# Patient Record
Sex: FEMALE | Race: WHITE | Employment: FULL TIME | URBAN - METROPOLITAN AREA
[De-identification: names, ages, dates, MRNs, and addresses within clinical notes are randomized per-mention and may not be internally consistent; named-entity substitution may affect disease eponyms.]

---

## 2021-09-03 ENCOUNTER — HOSPITAL ENCOUNTER (OUTPATIENT)
Dept: PHYSICAL THERAPY | Age: 46
Discharge: HOME | End: 2021-09-03
Payer: MEDICAID

## 2021-09-03 PROCEDURE — 97161 PT EVAL LOW COMPLEX 20 MIN: CPT | Performed by: PHYSICAL THERAPIST

## 2021-09-03 PROCEDURE — 97016 VASOPNEUMATIC DEVICE THERAPY: CPT | Performed by: PHYSICAL THERAPIST

## 2021-09-03 PROCEDURE — 97140 MANUAL THERAPY 1/> REGIONS: CPT | Performed by: PHYSICAL THERAPIST

## 2021-09-03 NOTE — PROGRESS NOTES
Physical Therapy at [facility],   a part of [address] [place] [address]  [address]  [place], [address]  Phone: [phone]  Fax: [phone]    Plan of Care/Statement of Necessity for Physical Therapy Services  2-15    Patient name: Anette Huang  : 1975  Provider#: [id number]  Referral source: Ralph Bey (Jody),*      Medical/Treatment Diagnosis: Left knee pain [M25.562]     Prior Hospitalization: see medical history     Comorbidities: None  Prior Level of Function: Able to bend, walk, run without knee pain or instability. Medications: Verified on Patient Summary List    Start of Care: 9/3/2021      Onset Date: 2021       The Plan of Care and following information is based on the information from the initial evaluation. Assessment/ key information: This patient presents with left knee pain, tenderness, effusion and pitting edema, decreased ROM, decreased gait tolerance and impaired function.     Evaluation Complexity History LOW Complexity : Zero comorbidities / personal factors that will impact the outcome / POC; Examination LOW Complexity : 1-2 Standardized tests and measures addressing body structure, function, activity limitation and / or participation in recreation  ;Presentation LOW Complexity : Stable, uncomplicated  ;Clinical Decision Making MEDIUM Complexity : FOTO score of 26-74  Overall Complexity Rating: LOW     Problem List: pain affecting function, decrease ROM, decrease strength, edema affecting function, impaired gait/ balance, decrease ADL/ functional abilitiies, decrease activity tolerance and decrease flexibility/ joint mobility   Treatment Plan may include any combination of the following: Therapeutic exercise, Therapeutic activities, Neuromuscular re-education, Physical agent/modality, Gait/balance training, Manual therapy, Patient education, Self Care training, Functional mobility training, Home safety training and Stair training  Patient / Family readiness to learn indicated by: asking questions, trying to perform skills and interest  Persons(s) to be included in education: patient (P)  Barriers to Learning/Limitations: None  Patient Goal (s): Agressive ROM prehab for surgery prep, post surgery ROM.   Patient Self Reported Health Status: excellent  Rehabilitation Potential: good    Short Term Goals: To be accomplished in 4-5 treatments:  1. AROM -3 to 110 degrees to improve gait pattern and ability to bend. 2. PROM 0 to 115 degrees to improve ability to bend and walk stairs. 3.  Independent HEP to date to address ROM limitations causing impaired function. Frequency / Duration: Patient to be seen 3 times per week for 4-5  treatments. Patient/ Caregiver education and instruction: exercises    [x]  Plan of care has been reviewed with PTA    New goals and plan will be established post surgery. Jagjit Matos V, PT 9/3/2021   ________________________________________________________________________    I certify that the above Therapy Services are being furnished while the patient is under my care. I agree with the treatment plan and certify that this therapy is necessary.     Physician's Signature:____________________  Date:____________Time: _________      Ralph Bailey(Noreen) P,*

## 2021-09-03 NOTE — PROGRESS NOTES
PT INITIAL EVALUATION NOTE 2-15    Patient Name: Mj Moseley  LOUP:[id number]  : 1975  [x]  Patient  Verified  Payor: [address] [place],9D / Plan: [address] [place] [id number] / Product Type: Managed Care Medicaid /    In time: 11:10 am  Out time: 12:15 pm  Total Treatment Time (min): 65  Visit #: 1     Treatment Area: Left knee pain [M25.562]    SUBJECTIVE  Pain Level (0-10 scale): 1  Any medication changes, allergies to medications, adverse drug reactions, diagnosis change, or new procedure performed?: [] No    [x] Yes (see summary sheet for update)  Subjective:     Patient is a . 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 She was trying to get out of the way of play, her left foot slipped causing her knee to buckle. She felt a pop as she went down. She saw Dr. Kevyn Bra, MRI + for ACL, MCL, and lateral meniscus tear. She is scheduled for surgery next week, but has limited ROM at this time, which may delay her surgery. She went to another PT this past week, but states they did not push her ROM. She was referred here to increase her ROM so that her surgery will hopefully go forward. She complains of left knee pain, especially at the [facility] attachment, swelling, decreased ROM with severe pain at end range of both flexion and extension. Her goal is to return to being a  as soon as possible. OBJECTIVE/EXAMINATION  Posture:  Left knee lacks end range extension. Gait:  2 axillary crutches, left knee brace. Effusion:    Mid Patella: +2 cm    Supra Patella:  +2 cm    AROM:      Right Knee:  +3 to 140 degrees       Left Knee:     -10 to 65 degrees     PROM:   Left knee:  -8 to 90 degrees  (-2 to 95 degrees after Game Ready)     STRENGTH: Not tested. Special Tests:  Valgus:  Not tested  Lachman's:  Not tested  Patella apprehension:  Negative    Palpation:  Tenderness right MCL attachment onto the medial femoral condyle. There is pitting edema in tissues around the knee. Modality rationale: decrease edema, decrease inflammation and decrease pain to improve the patients ability to bend, walk, run. Min Type Additional Details    [] Estim: []Att   []Unatt        []TENS instruct                  []IFC  []Premod   []NMES                     []Other:  []w/US   []w/ice   []w/heat  Position:  Location:    []  Traction: [] Cervical       []Lumbar                       [] Prone          []Supine                       []Intermittent   []Continuous Lbs:  [] before manual  [] after manual  []w/heat    []  Ultrasound: []Continuous   [] Pulsed at:                            []1MHz   []3MHz Location:  W/cm2:    []  Paraffin         Location:  []w/heat    []  Ice     []  Heat  []  Ice massage Position:  Location:    []  Laser  []  Other: Position:  Location:   15 [x]  Vasopneumatic Device Pressure:       [] lo [] med [x] hi   Temperature: 34 degrees   [x] Skin assessment post-treatment:  [x]intact []redness- no adverse reaction    []redness  adverse reaction:   20 min Manual Therapy:    Patella mobs  Gentle knee extension stretch pre and post Gameready  Seated knee mobs:  Medial rotation, anterior and posterior glides to increase ROM  Seated flexion PROM pre and post Gameready   Rationale: decrease pain, increase ROM and increase tissue extensibility  to improve the patients ability to bend, walk, and run.           With   [x] TE   [] TA   [] Neuro   [] SC   [] other: Patient Education: [x] Review HEP    [] Progressed/Changed HEP based on:   [] positioning   [] body mechanics   [] transfers   [] heat/ice application    [] other:      Pain Level (0-10 scale) post treatment: 1    ASSESSMENT:      [x]  See Plan of CodeNgo V, PT 9/3/2021

## 2021-09-07 ENCOUNTER — HOSPITAL ENCOUNTER (OUTPATIENT)
Dept: PHYSICAL THERAPY | Age: 46
Discharge: HOME | End: 2021-09-07
Payer: MEDICAID

## 2021-09-07 PROCEDURE — 97140 MANUAL THERAPY 1/> REGIONS: CPT | Performed by: PHYSICAL THERAPIST

## 2021-09-07 PROCEDURE — 97016 VASOPNEUMATIC DEVICE THERAPY: CPT | Performed by: PHYSICAL THERAPIST

## 2021-09-07 NOTE — PROGRESS NOTES
PT DAILY TREATMENT NOTE 2-15    Patient Name: Vane Ricketts  Date:2021  : 1975  [x]  Patient  Verified  Payor: Eh [place] / Plan: [address] [place] [id number] / Product Type: Managed Care Medicaid /    In time:  7:55 am  Out time: 8:35 am  Total Treatment Time (min): 40  Visit #:  2    Treatment Area: Left knee pain [M25.562]    SUBJECTIVE  Pain Level (0-10 scale): 0  Any medication changes, allergies to medications, adverse drug reactions, diagnosis change, or new procedure performed?: [x] No    [] Yes (see summary sheet for update)  Subjective functional status/changes:   [] No changes reported  \"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 \"    OBJECTIVE  Ambulates without crutches today. Left bent knee gait pattern. Effusion:   Mid patella:  +1 cm   Supra patella:  +1.5 cm    AROM:  -5 degrees extension    PROM:  -2 to 100 degrees, 105 degrees flexion after Gameready    Modality rationale: decrease edema, decrease inflammation and decrease pain to improve the patients ability to bend, walk, and run.    Min Type Additional Details       [] Estim: []Att   []Unatt    []TENS instruct                  []IFC  []Premod   []NMES                     []Other:  []w/US   []w/ice   []w/heat  Position:  Location:       []  Traction: [] Cervical       []Lumbar                       [] Prone          []Supine                       []Intermittent   []Continuous Lbs:  [] before manual  [] after manual  []w/heat    []  Ultrasound: []Continuous   [] Pulsed                       at: []1MHz   []3MHz Location:  W/cm2:    [] Paraffin         Location:   []w/heat    []  Ice     []  Heat  []  Ice massage Position:  Location:    []  Laser  []  Other: Position:  Location:   15   [x]  Vasopneumatic Device Pressure:       [] lo [] med [x] hi   Temperature: 34 degrees     [x] Skin assessment post-treatment:  [x]intact []redness- no adverse reaction    []redness  adverse reaction:      min Therapeutic Exercise:  [x] See flow sheet :   Rationale: increase ROM and increase strength to improve the patients ability to bend, walk, and run. 25 min Manual Therapy:    Patella mobs  Gentle knee extension stretch pre and post Gameready  Seated knee mobs:  Medial rotation, anterior and posterior glides to increase ROM  Seated flexion PROM    Rationale: decrease pain, increase ROM and increase tissue extensibility  to improve the patients ability to bend, walk, and run. With   [x] TE   [] TA   [] Neuro   [] SC   [] other: Patient Education: [x] Review HEP    [] Progressed/Changed HEP based on:   [] positioning   [] body mechanics   [] transfers   [] heat/ice application    [] other:      Other Objective/Functional Measures:      Pain Level (0-10 scale) post treatment: 1    ASSESSMENT/Changes in Function: Increased ROM today. Limited by pain at available end range. Patient will continue to benefit from skilled PT services to modify and progress therapeutic interventions, address functional mobility deficits, address ROM deficits, address strength deficits, analyze and address soft tissue restrictions, analyze and cue movement patterns, analyze and modify body mechanics/ergonomics and assess and modify postural abnormalities to attain remaining goals.      []  See Plan of Care  []  See progress note/recertification  []  See Discharge Summary         Progress towards goals / Updated goals:    PLAN  [x]  Upgrade activities as tolerated     [x]  Continue plan of care  []  Update interventions per flow sheet       []  Discharge due to:_  []  Other:_      Jeanine Bryson, PT 9/7/2021

## 2021-09-08 ENCOUNTER — HOSPITAL ENCOUNTER (OUTPATIENT)
Dept: PHYSICAL THERAPY | Age: 46
Discharge: HOME | End: 2021-09-08
Payer: MEDICAID

## 2021-09-08 PROCEDURE — 97016 VASOPNEUMATIC DEVICE THERAPY: CPT | Performed by: PHYSICAL THERAPIST

## 2021-09-08 PROCEDURE — 97140 MANUAL THERAPY 1/> REGIONS: CPT | Performed by: PHYSICAL THERAPIST

## 2021-09-08 PROCEDURE — 97110 THERAPEUTIC EXERCISES: CPT | Performed by: PHYSICAL THERAPIST

## 2021-09-08 NOTE — PROGRESS NOTES
PT DAILY TREATMENT NOTE 2-15    Patient Name: Darius Garibay  Date:2021  : 1975  [x]  Patient  Verified  Payor: [address] [place],9D / Plan: [address] [place] [id number] / Product Type: Managed Care Medicaid /    In time:  11:00 am  Out time: 12:10 am  Total Treatment Time (min): 70  Visit #:  3    Treatment Area: Left knee pain [M25.562]    SUBJECTIVE  Pain Level (0-10 scale): 0  Any medication changes, allergies to medications, adverse drug reactions, diagnosis change, or new procedure performed?: [x] No    [] Yes (see summary sheet for update)  Subjective functional status/changes:     \"I see him today. I'm getting so frustrated because I can't do the things that everyone else can. \"    OBJECTIVE    Effusion:   Mid patella:  +1 cm   Supra patella:  +1 cm    AROM:  0 degrees extension (after manual stretch)    PROM:  108 degrees flexion after Gameready    Modality rationale: decrease edema, decrease inflammation and decrease pain to improve the patients ability to bend, walk, and run.    Min Type Additional Details       [] Estim: []Att   []Unatt    []TENS instruct                  []IFC  []Premod   []NMES                     []Other:  []w/US   []w/ice   []w/heat  Position:  Location:       []  Traction: [] Cervical       []Lumbar                       [] Prone          []Supine                       []Intermittent   []Continuous Lbs:  [] before manual  [] after manual  []w/heat    []  Ultrasound: []Continuous   [] Pulsed                       at: []1MHz   []3MHz Location:  W/cm2:    [] Paraffin         Location:   []w/heat    []  Ice     []  Heat  []  Ice massage Position:  Location:    []  Laser  []  Other: Position:  Location:   15   [x]  Vasopneumatic Device Pressure:       [] lo [] med [x] hi   Temperature: 34 degrees     [x] Skin assessment post-treatment:  [x]intact []redness- no adverse reaction    []redness  adverse reaction:     15 min Therapeutic Exercise:  [x] See flow sheet :   Rationale: increase ROM and increase strength to improve the patients ability to bend, walk, and run. 35 min Manual Therapy:    Patella mobs  Gentle knee extension stretch pre and post Gameready  Seated knee mobs:  Medial rotation, anterior and posterior glides to increase ROM  Seated flexion PROM    Rationale: decrease pain, increase ROM and increase tissue extensibility  to improve the patients ability to bend, walk, and run. With   [x] TE   [] TA   [] Neuro   [] SC   [] other: Patient Education: [x] Review HEP    [] Progressed/Changed HEP based on:   [] positioning   [] body mechanics   [] transfers   [] heat/ice application    [] other:      Other Objective/Functional Measures:      Pain Level (0-10 scale) post treatment: 1    ASSESSMENT/Changes in Function: Improved extension, slightly increased flexion ROM. Medial and posterolateral knee pain at end range. Patient will continue to benefit from skilled PT services to modify and progress therapeutic interventions, address functional mobility deficits, address ROM deficits, address strength deficits, analyze and address soft tissue restrictions, analyze and cue movement patterns, analyze and modify body mechanics/ergonomics and assess and modify postural abnormalities to attain remaining goals. []  See Plan of Care  []  See progress note/recertification  []  See Discharge Summary         Progress towards goals / Updated goals:    PLAN  [x]  Upgrade activities as tolerated     [x]  Continue plan of care  []  Update interventions per flow sheet       []  Discharge due to:_  [x]  Follow up with Dr. Adriana Reyna today.      Shana Andino V, PT 9/8/2021

## 2021-09-13 ENCOUNTER — HOSPITAL ENCOUNTER (OUTPATIENT)
Dept: PHYSICAL THERAPY | Age: 46
Discharge: HOME | End: 2021-09-13
Payer: MEDICAID

## 2021-09-13 PROCEDURE — 97110 THERAPEUTIC EXERCISES: CPT | Performed by: PHYSICAL THERAPIST

## 2021-09-13 PROCEDURE — 97140 MANUAL THERAPY 1/> REGIONS: CPT | Performed by: PHYSICAL THERAPIST

## 2021-09-13 PROCEDURE — 97016 VASOPNEUMATIC DEVICE THERAPY: CPT | Performed by: PHYSICAL THERAPIST

## 2021-09-13 NOTE — PROGRESS NOTES
PT DAILY TREATMENT NOTE 2-15    Patient Name: Amarilys Fletcher  Date:2021  : 1975  [x]  Patient  Verified  Payor: Anoop Fragoso / Plan: [address] [place] [id number] / Product Type: Managed Care Medicaid /    In time:  10:00 am  Out time: 11:15 am  Total Treatment Time (min): 75  Visit #:  4    Treatment Area: Left knee pain [M25.562]    SUBJECTIVE  Pain Level (0-10 scale): 0  Any medication changes, allergies to medications, adverse drug reactions, diagnosis change, or new procedure performed?: [x] No    [] Yes (see summary sheet for update)  Subjective functional status/changes:     States seeing Dr. Eleni Youngblood this week. Dr. Tiffanie Castlilo out of town. OBJECTIVE    Effusion:   Mid patella:  +1 cm   Supra patella:  +1 cm    AROM:  0 degrees extension (after manual stretch)    PROM:  115 degrees flexion (seated), 120 degrees after Gameready. Modality rationale: decrease edema, decrease inflammation and decrease pain to improve the patients ability to bend, walk, and run.    Min Type Additional Details       [] Estim: []Att   []Unatt    []TENS instruct                  []IFC  []Premod   []NMES                     []Other:  []w/US   []w/ice   []w/heat  Position:  Location:       []  Traction: [] Cervical       []Lumbar                       [] Prone          []Supine                       []Intermittent   []Continuous Lbs:  [] before manual  [] after manual  []w/heat    []  Ultrasound: []Continuous   [] Pulsed                       at: []1MHz   []3MHz Location:  W/cm2:    [] Paraffin         Location:   []w/heat    []  Ice     []  Heat  []  Ice massage Position:  Location:    []  Laser  []  Other: Position:  Location:   15   [x]  Vasopneumatic Device Pressure:       [] lo [] med [x] hi   Temperature: 34 degrees     [x] Skin assessment post-treatment:  [x]intact []redness- no adverse reaction    []redness  adverse reaction:     20 min Therapeutic Exercise:  [x] See flow sheet : Rationale: increase ROM and increase strength to improve the patients ability to bend, walk, and run. 35 min Manual Therapy:    Patella mobs  Gentle knee extension stretch pre and post Gameready  Seated knee mobs:  Medial rotation, anterior and posterior glides to increase ROM  Seated flexion PROM (before and after Gameready)   Rationale: decrease pain, increase ROM and increase tissue extensibility  to improve the patients ability to bend, walk, and run. With   [x] TE   [] TA   [] Neuro   [] SC   [] other: Patient Education: [x] Review HEP    [] Progressed/Changed HEP based on:   [] positioning   [] body mechanics   [] transfers   [] heat/ice application    [] other:      Other Objective/Functional Measures:      Pain Level (0-10 scale) post treatment: 0    ASSESSMENT/Changes in Function: Increased PROM today. Patient will continue to benefit from skilled PT services to modify and progress therapeutic interventions, address functional mobility deficits, address ROM deficits, address strength deficits, analyze and address soft tissue restrictions, analyze and cue movement patterns, analyze and modify body mechanics/ergonomics and assess and modify postural abnormalities to attain remaining goals.      []  See Plan of Care  []  See progress note/recertification  []  See Discharge Summary         Progress towards goals / Updated goals:    PLAN  [x]  Upgrade activities as tolerated     [x]  Continue plan of care  []  Update interventions per flow sheet       []  Discharge due to:_  []     Mary Masterson, PT 9/13/2021

## 2021-09-14 ENCOUNTER — HOSPITAL ENCOUNTER (OUTPATIENT)
Dept: PHYSICAL THERAPY | Age: 46
Discharge: HOME | End: 2021-09-14
Payer: MEDICAID

## 2021-09-14 PROCEDURE — 97110 THERAPEUTIC EXERCISES: CPT | Performed by: PHYSICAL THERAPIST

## 2021-09-14 PROCEDURE — 97016 VASOPNEUMATIC DEVICE THERAPY: CPT | Performed by: PHYSICAL THERAPIST

## 2021-09-14 PROCEDURE — 97140 MANUAL THERAPY 1/> REGIONS: CPT | Performed by: PHYSICAL THERAPIST

## 2021-09-15 NOTE — PROGRESS NOTES
MD NOTE 2-15    Patient Name: Amarilys Fletcher  Date:9/15/2021  : 1975  [x]  Patient  Verified  Payor: Anoop Fragoso / Plan: [address] [place] [address] / Product Type: Managed Care Medicaid /      Treatment Area: Left knee pain [M25.562]    SUBJECTIVE  Pain Level (0-10 scale): 2-3    Subjective functional status/changes:     States knee is sore today. OBJECTIVE    Effusion:   Mid patella:  +1.5 cm   Supra patella:  +1 cm    AROM:  -5 degrees extension (0 degrees passive)    PROM:  122 degrees flexion (seated)    Pain Level (0-10 scale) post treatment: 2    ASSESSMENT/Changes in Function:  Improved flexion PROM today. Steady improvements in ROM since initial visit. PLAN:  [x]  Being evaluated by Dr. Eleni Youngblood for surgery.     Genny Rhodes V, PT 9/15/2021

## 2021-09-15 NOTE — PROGRESS NOTES
PT DAILY TREATMENT NOTE 2-15    Patient Name: Paul Larger  Date:2021  : 1975  [x]  Patient  Verified  Payor: Silvana Fitzgerald / Plan: 1 [facility] / Product Type: Managed Care Medicaid /    In time:  12:15 pm  Out time: 1:20 pm  Total Treatment Time (min): 65  Visit #:  5    Treatment Area: Left knee pain [M25.562]    SUBJECTIVE  Pain Level (0-10 scale): 2-3  Any medication changes, allergies to medications, adverse drug reactions, diagnosis change, or new procedure performed?: [x] No    [] Yes (see summary sheet for update)  Subjective functional status/changes:     States knee is sore today. OBJECTIVE    Effusion:   Mid patella:  +1.5 cm   Supra patella:  +1 cm    AROM:  -5 degrees extension    PROM:  122 degrees flexion (seated)    Modality rationale: decrease edema, decrease inflammation and decrease pain to improve the patients ability to bend, walk, and run.    Min Type Additional Details       [] Estim: []Att   []Unatt    []TENS instruct                  []IFC  []Premod   []NMES                     []Other:  []w/US   []w/ice   []w/heat  Position:  Location:       []  Traction: [] Cervical       []Lumbar                       [] Prone          []Supine                       []Intermittent   []Continuous Lbs:  [] before manual  [] after manual  []w/heat    []  Ultrasound: []Continuous   [] Pulsed                       at: []1MHz   []3MHz Location:  W/cm2:    [] Paraffin         Location:   []w/heat    []  Ice     []  Heat  []  Ice massage Position:  Location:    []  Laser  []  Other: Position:  Location:   15   [x]  Vasopneumatic Device Pressure:       [] lo [] med [x] hi   Temperature: 34 degrees     [x] Skin assessment post-treatment:  [x]intact []redness- no adverse reaction    []redness  adverse reaction:     20 min Therapeutic Exercise:  [x] See flow sheet :   Rationale: increase ROM and increase strength to improve the patients ability to bend, walk, and run. 25 min Manual Therapy:    Patella mobs  Gentle knee extension stretch pre and post Gameready  Seated knee mobs:  Medial rotation, anterior and posterior glides to increase ROM  Manual rectus femoris stretch  Seated flexion PROM    Rationale: decrease pain, increase ROM and increase tissue extensibility  to improve the patients ability to bend, walk, and run. With   [x] TE   [] TA   [] Neuro   [] SC   [] other: Patient Education: [x] Review HEP    [] Progressed/Changed HEP based on:   [] positioning   [] body mechanics   [] transfers   [] heat/ice application    [] other:      Other Objective/Functional Measures:      Pain Level (0-10 scale) post treatment: 2    ASSESSMENT/Changes in Function: Improved flexion PROM today. Patient will continue to benefit from skilled PT services to modify and progress therapeutic interventions, address functional mobility deficits, address ROM deficits, address strength deficits, analyze and address soft tissue restrictions, analyze and cue movement patterns, analyze and modify body mechanics/ergonomics and assess and modify postural abnormalities to attain remaining goals. []  See Plan of Care  []  See progress note/recertification  []  See Discharge Summary         Progress towards goals / Updated goals:    PLAN  [x]  Upgrade activities as tolerated     [x]  Continue plan of care  []  Update interventions per flow sheet       []  Discharge due to:_  [x]  Being evaluated by Dr. Lesley Ruiz for surgery.     Darío Lopez V, PT 9/14/2021

## 2021-09-21 ENCOUNTER — HOSPITAL ENCOUNTER (OUTPATIENT)
Dept: PHYSICAL THERAPY | Age: 46
Discharge: HOME | End: 2021-09-21
Payer: MEDICAID

## 2021-09-21 PROCEDURE — 97016 VASOPNEUMATIC DEVICE THERAPY: CPT | Performed by: PHYSICAL THERAPIST

## 2021-09-21 PROCEDURE — 97140 MANUAL THERAPY 1/> REGIONS: CPT | Performed by: PHYSICAL THERAPIST

## 2021-09-21 PROCEDURE — 97161 PT EVAL LOW COMPLEX 20 MIN: CPT | Performed by: PHYSICAL THERAPIST

## 2021-09-21 PROCEDURE — 97110 THERAPEUTIC EXERCISES: CPT | Performed by: PHYSICAL THERAPIST

## 2021-09-21 NOTE — PROGRESS NOTES
PT INITIAL EVALUATION NOTE 2-15    Patient Name: Richard Rodriguez  Date:2021  : 1975  [x]  Patient  Verified  Payor: [address],9D / Plan: [address] [place] [id number] / Product Type: Managed Care Medicaid /    In time:  10:40 am  Out time: 11:45 am   Total Treatment Time (min): 65  Visit #: 1     Treatment Area: Left knee pain [M25.562]    SUBJECTIVE  Pain Level (0-10 scale): 7  Any medication changes, allergies to medications, adverse drug reactions, diagnosis change, or new procedure performed?: [] No    [x] Yes (see summary sheet for update)  Subjective:     Patient is a . 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 She was trying to get out of the way of play, her left foot slipped causing her knee to buckle. She felt a pop as she went down. She saw Dr. Tessie Aden, MRI + for ACL, MCL, and lateral meniscus tear. Surgery was delayed because she developed a stiff knee. She received PT here in this office with improved ROM, but still not enough to have the procedure. Dr. Tessie Aden was going out of town so she was referred to Dr. Birgit Ochoa during that week. She had ACL reconstruction with patella tendon autograft on 2021. She complains of left knee pain, swelling, severe stiffness, inability to actively  her leg, even in the brace, and impaired function. Her goal is to return to being a  as soon as possible. OBJECTIVE/EXAMINATION  Gait:  Ambulates with 2 axillary crutches, post op brace, decreased stance left. Effusion: Mid Patella: +2.5 cm  Supra Patella:  +2.5 cm    AROM:      Right Knee:  +3 to 140 degrees         Left Knee: -5 to 25 degrees    PROM:   Left knee: -2 to 75 degrees        Flexibility:   L  Hamstrings   Mod limitation  Quads    Severe limitation    STRENGTH: Unable to actively engage her left  quad.       Special Tests:  Zoe's sign:  Negative    Palpation:  Tenderness donya scar area.    Modality rationale: decrease edema, decrease inflammation and decrease pain to improve the patients ability to bend, walk, run. Min Type Additional Details    [] Estim: []Att   []Unatt        []TENS instruct                  []IFC  []Premod   []NMES                     []Other:  []w/US   []w/ice   []w/heat  Position:  Location:    []  Traction: [] Cervical       []Lumbar                       [] Prone          []Supine                       []Intermittent   []Continuous Lbs:  [] before manual  [] after manual  []w/heat    []  Ultrasound: []Continuous   [] Pulsed at:                            []1MHz   []3MHz Location:  W/cm2:    []  Paraffin         Location:  []w/heat    []  Ice     []  Heat  []  Ice massage Position:  Location:    []  Laser  []  Other: Position:  Location:   15 [x]  Vasopneumatic Device Pressure:       [] lo [x] med [] hi   Temperature: 34 degrees   [x] Skin assessment post-treatment:  [x]intact []redness- no adverse reaction    []redness  adverse reaction:     10 min Therapeutic Exercise:  [x] See flow sheet :   Rationale: increase ROM and increase strength to improve the patients ability to bend, walk, run. 20 min Manual Therapy:    Patella mobs  Kathleen scar mobs  Gentle manual extension stretch  Seated flexion PROM (gravity assisted) repeated mid range to end range as tolerated in sitting   Rationale: decrease pain, increase ROM and increase tissue extensibility  to improve the patients ability to bend, walk, run.           With   [x] TE   [] TA   [] Neuro   [] SC   [] other: Patient Education: [x] Review HEP    [] Progressed/Changed HEP based on:   [] positioning   [] body mechanics   [] transfers   [] heat/ice application    [] other:        Other Objective/Functional Measures: FOTO Functional Measure: 24/100                  Pain Level (0-10 scale) post treatment: 5      ASSESSMENT:      [x]  See Plan of Eliecer Son V, PT 9/21/2021

## 2021-09-23 ENCOUNTER — HOSPITAL ENCOUNTER (OUTPATIENT)
Dept: PHYSICAL THERAPY | Age: 46
Discharge: HOME | End: 2021-09-23
Payer: MEDICAID

## 2021-09-23 PROCEDURE — 97016 VASOPNEUMATIC DEVICE THERAPY: CPT | Performed by: PHYSICAL THERAPIST

## 2021-09-23 PROCEDURE — 97110 THERAPEUTIC EXERCISES: CPT | Performed by: PHYSICAL THERAPIST

## 2021-09-23 PROCEDURE — 97140 MANUAL THERAPY 1/> REGIONS: CPT | Performed by: PHYSICAL THERAPIST

## 2021-09-23 NOTE — PROGRESS NOTES
PT DAILY TREATMENT NOTE - [facility] 2-15    Patient Name: Ed German  Date:2021  : 1975  [x]  Patient  Verified  Payor: Jaimie Lim / Plan: [address] [place] [id number] / Product Type: Managed Care Medicaid /    In time:  10:05 am  Out time:   11:15 am  Total Treatment Time (min): 70  Total Timed Codes (min): 50  1:1 Treatment Time ([facility] only): 50   Visit #:  2    Treatment Area: Left knee pain [M25.562]    SUBJECTIVE  Pain Level (0-10 scale): 5  Any medication changes, allergies to medications, adverse drug reactions, diagnosis change, or new procedure performed?: [x] No    [] Yes (see summary sheet for update)  Subjective functional status/changes:   [] No changes reported  \"It's really sore today. \"    OBJECTIVE  Effusion:  Mid and supra patella +2 cm    AROM:  -3 degrees extension    PROM:  82 degrees seated flexion (after prolonged gentle progressive stretch)    Modality rationale: decrease edema, decrease inflammation and decrease pain to improve the patients ability to bend, walk, run.    Min Type Additional Details       [] Estim: []Att   []Unatt    []TENS instruct                  []IFC  []Premod   []NMES                     []Other:  []w/US   []w/ice   []w/heat  Position:  Location:       []  Traction: [] Cervical       []Lumbar                       [] Prone          []Supine                       []Intermittent   []Continuous Lbs:  [] before manual  [] after manual  []w/heat    []  Ultrasound: []Continuous   [] Pulsed                       at: []1MHz   []3MHz Location:  W/cm2:    [] Paraffin         Location:   []w/heat    []  Ice     []  Heat  []  Ice massage Position:  Location:    []  Laser  []  Other: Position:  Location:   15   [x]  Vasopneumatic Device Pressure:       [] lo [x] med [] hi   Temperature: 34 degrees     [x] Skin assessment post-treatment:  [x]intact []redness- no adverse reaction    []redness  adverse reaction:     15 min Therapeutic Exercise:  [x] See flow sheet :   Rationale: increase ROM and increase strength to improve the patients ability to bend, walk, run. 35 min Manual Therapy:   Patella mobs  Kathleen scar mobs  Gentle manual extension stretch  Seated flexion PROM (gravity assisted) repeated mid range to end range as tolerated     Rationale: decrease pain, increase ROM and increase tissue extensibility to improve the patients ability to bend, walk, run.     min Gait Training:  ___ feet with ___ device on level surfaces with ___ level of assist   Rationale: increase ROM, increase strength, improve coordination and increase proprioception  to improve the patients ability to walk with normal gait pattern. With   [x] TE   [] TA   [] Neuro   [] SC   [] other: Patient Education: [x] Review HEP    [] Progressed/Changed HEP based on:   [] positioning   [] body mechanics   [] transfers   [] heat/ice application    [] other:      Other Objective/Functional Measures:      Pain Level (0-10 scale) post treatment: 2    ASSESSMENT/Changes in Function: Increased PROM today, tight initially, but improves with gentle progressive stretch. Patient will continue to benefit from skilled PT services to modify and progress therapeutic interventions, address functional mobility deficits, address ROM deficits, address strength deficits, analyze and address soft tissue restrictions, analyze and cue movement patterns, analyze and modify body mechanics/ergonomics and assess and modify postural abnormalities to attain remaining goals.      []  See Plan of Care  []  See progress note/recertification  []  See Discharge Summary         Progress towards goals / Updated goals:      PLAN  [x]  Upgrade activities as tolerated     [x]  Continue plan of care  []  Update interventions per flow sheet       []  Discharge due to:_  []  Other:_      Frank Perez, PT 9/23/2021

## 2021-09-23 NOTE — PROGRESS NOTES
Physical Therapy at [facility],   a part of [address], [address] [place] [address]  Phone: [phone]  Fax: [phone]    Plan of Care/Statement of Necessity for Physical Therapy Services  2-15    Patient name: Suad Luis  : 1975  Provider#: [id number]  Referral source: Violet Osman MD      Medical/Treatment Diagnosis: Left knee pain [M25.562]     Prior Hospitalization: see medical history     Comorbidities: None  Prior Level of Function: Able to bend, walk, run, cut, work as a  without limitation. Medications: Verified on Patient Summary List    Start of Care: 2021      Onset Date: 2021       The Plan of Care and following information is based on the information from the initial evaluation. Assessment/ key information: Patient presents s/p ACL reconstruction and menisectomy with left knee pain, effusion, decreased ROM, decreased quad control, deviated gait pattern and impaired function.     Evaluation Complexity History LOW Complexity : Zero comorbidities / personal factors that will impact the outcome / POC; Examination LOW Complexity : 1-2 Standardized tests and measures addressing body structure, function, activity limitation and / or participation in recreation  ;Presentation LOW Complexity : Stable, uncomplicated  ;Clinical Decision Making HIGH Complexity : FOTO score of 1- 25   Overall Complexity Rating: LOW     Problem List: pain affecting function, decrease ROM, decrease strength, edema affecting function, impaired gait/ balance, decrease ADL/ functional abilitiies, decrease activity tolerance, decrease flexibility/ joint mobility and decrease transfer abilities   Treatment Plan may include any combination of the following: Therapeutic exercise, Therapeutic activities, Neuromuscular re-education, Physical agent/modality, Gait/balance training, Manual therapy, Patient education, Self Care training, Functional mobility training, Home safety training and Stair training  Patient / Family readiness to learn indicated by: asking questions, trying to perform skills and interest  Persons(s) to be included in education: patient (P)  Barriers to Learning/Limitations: None  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   Patient Self Reported Health Status: excellent  Rehabilitation Potential: excellent    Short Term Goals: To be accomplished in 4 weeks:  1. AROM left knee 0 to 115 to improve functional bending. 2. PROM +1 to 120 degrees to improve functional bending. Long Term Goals: To be accomplished in 16 weeks:  1. AROM +2 to 135 degrees to tolerate bending and squatting. 2.  Strength left quads and hip 4+/5, hamstrings 5/5 to tolerate prolonged walking and stairs. 3.  Walks with normal gait pattern. 4.  Able to run, change directions, perform intentional cutting without difficulty. Frequency / Duration: Patient to be seen 2-3 times per week for 16 weeks. Patient/ Caregiver education and instruction: exercises    [x]  Plan of care has been reviewed with ALVERTO Sandoval, PT 9/21/2021   ________________________________________________________________________    I certify that the above Therapy Services are being furnished while the patient is under my care. I agree with the treatment plan and certify that this therapy is necessary.     Physician's Signature:____________________  Date:____________Time: _________      Tato Guthrie MD

## 2021-09-27 ENCOUNTER — HOSPITAL ENCOUNTER (OUTPATIENT)
Dept: PHYSICAL THERAPY | Age: 46
Discharge: HOME | End: 2021-09-27
Payer: MEDICAID

## 2021-09-27 PROCEDURE — 97140 MANUAL THERAPY 1/> REGIONS: CPT | Performed by: PHYSICAL THERAPIST

## 2021-09-27 PROCEDURE — 97110 THERAPEUTIC EXERCISES: CPT | Performed by: PHYSICAL THERAPIST

## 2021-09-27 NOTE — PROGRESS NOTES
PT DAILY TREATMENT NOTE - [facility] 2-15    Patient Name: Zara Bowers  Date:2021  : 1975  [x]  Patient  Verified  Payor: Judson Francois / Plan: [address] [place] [id number] / Product Type: Managed Care Medicaid /    In time:  11:30 am  Out time:  12:30 am  Total Treatment Time (min): 60  Total Timed Codes (min): 55  1:1 Treatment Time ( [address] only): 54   Visit #:  3    Treatment Area: Left knee pain [M25.562]    SUBJECTIVE  Pain Level (0-10 scale): 5  Any medication changes, allergies to medications, adverse drug reactions, diagnosis change, or new procedure performed?: [x] No    [] Yes (see summary sheet for update)  Subjective functional status/changes:   [] No changes reported  \"I feel like I'm regressing. I talked to someone else who's kid had it done and they're already at 90 degrees and able to do straight leg raises. \"    OBJECTIVE  Effusion:  Mid and supra patella +2 cm    PROM:  83 degrees seated flexion (after prolonged gentle progressive stretch)    Modality rationale: decrease edema, decrease inflammation and decrease pain to improve the patients ability to bend, walk, run.    Min Type Additional Details       [] Estim: []Att   []Unatt    []TENS instruct                  []IFC  []Premod   []NMES                     []Other:  []w/US   []w/ice   []w/heat  Position:  Location:       []  Traction: [] Cervical       []Lumbar                       [] Prone          []Supine                       []Intermittent   []Continuous Lbs:  [] before manual  [] after manual  []w/heat    []  Ultrasound: []Continuous   [] Pulsed                       at: []1MHz   []3MHz Location:  W/cm2:    [] Paraffin         Location:   []w/heat   At home []  Ice     []  Heat  []  Ice massage Position:  Location:    []  Laser  []  Other: Position:  Location:      [x]  Vasopneumatic Device Pressure:       [] lo [x] med [] hi   Temperature: 34 degrees     [x] Skin assessment post-treatment:  [x]intact []redness- no adverse reaction    []redness  adverse reaction:     25 min Therapeutic Exercise:  [x] See flow sheet :   Rationale: increase ROM and increase strength to improve the patients ability to bend, walk, run. 30 min Manual Therapy:   Patella mobs  Kathleen scar mobs  Gentle manual extension stretch  Seated flexion PROM (gravity assisted) repeated mid range to end range as tolerated     Rationale: decrease pain, increase ROM and increase tissue extensibility to improve the patients ability to bend, walk, run.     min Gait Training:  ___ feet with ___ device on level surfaces with ___ level of assist   Rationale: increase ROM, increase strength, improve coordination and increase proprioception  to improve the patients ability to walk with normal gait pattern. With   [x] TE   [] TA   [] Neuro   [] SC   [] other: Patient Education: [x] Review HEP    [] Progressed/Changed HEP based on:   [] positioning   [] body mechanics   [] transfers   [] heat/ice application    [] other:      Other Objective/Functional Measures:      Pain Level (0-10 scale) post treatment: 3    ASSESSMENT/Changes in Function: Patient progressing well to date. Flexion stiff initially, but improves with progressive PROM. Patient will continue to benefit from skilled PT services to modify and progress therapeutic interventions, address functional mobility deficits, address ROM deficits, address strength deficits, analyze and address soft tissue restrictions, analyze and cue movement patterns, analyze and modify body mechanics/ergonomics and assess and modify postural abnormalities to attain remaining goals. []  See Plan of Care  []  See progress note/recertification  []  See Discharge Summary         Progress towards goals / Updated goals:    PLAN  [x]  Upgrade activities as tolerated     [x]  Continue plan of care  []  Update interventions per flow sheet       []  Discharge due to:_  [x]  Follow up at end of the week. Lew Blevins V, PT 9/27/2021

## 2021-09-30 ENCOUNTER — HOSPITAL ENCOUNTER (OUTPATIENT)
Dept: PHYSICAL THERAPY | Age: 46
Discharge: HOME | End: 2021-09-30
Payer: MEDICAID

## 2021-09-30 PROCEDURE — 97014 ELECTRIC STIMULATION THERAPY: CPT | Performed by: PHYSICAL THERAPIST

## 2021-09-30 PROCEDURE — 97016 VASOPNEUMATIC DEVICE THERAPY: CPT | Performed by: PHYSICAL THERAPIST

## 2021-09-30 PROCEDURE — 97140 MANUAL THERAPY 1/> REGIONS: CPT | Performed by: PHYSICAL THERAPIST

## 2021-09-30 PROCEDURE — 97110 THERAPEUTIC EXERCISES: CPT | Performed by: PHYSICAL THERAPIST

## 2021-09-30 NOTE — PROGRESS NOTES
PT DAILY TREATMENT NOTE - [facility] 2-15    Patient Name: Aaron Sousa  Date:2021  : 1975  [x]  Patient  Verified  Payor: Aisha Kidney / Plan: [address] [place] [id number] / Product Type: Managed Care Medicaid /    In time:  12:25 pm  Out time:  1:40 am  Total Treatment Time (min): 75  Total Timed Codes (min): 40  1:1 Treatment Time ( only): 40   Visit #:  4    Treatment Area: Left knee pain [M25.562]    SUBJECTIVE  Pain Level (0-10 scale): 0  Any medication changes, allergies to medications, adverse drug reactions, diagnosis change, or new procedure performed?: [x] No    [] Yes (see summary sheet for update)  Subjective functional status/changes:   [] No changes reported  \"I feel like I'm regressing. I talked to someone else who's kid had it done and they're already at 90 degrees and able to do straight leg raises. \"    OBJECTIVE  Effusion:  Mid and supra patella +1.5 cm    PROM:  80 degrees seated flexion (after prolonged gentle progressive stretch)    Modality rationale: decrease edema, decrease inflammation and decrease pain to improve the patients ability to bend, walk, run.    Min Type Additional Details      10 [x] Estim: []Att   []Unatt    []TENS instruct                  []IFC  []Premod   []NMES                     [x]Czech 10/20 sec on/off  []w/US   []w/ice   []w/heat  Position:  Location:  Left quad       []  Traction: [] Cervical       []Lumbar                       [] Prone          []Supine                       []Intermittent   []Continuous Lbs:  [] before manual  [] after manual  []w/heat    []  Ultrasound: []Continuous   [] Pulsed                       at: []1MHz   []3MHz Location:  W/cm2:    [] Paraffin         Location:   []w/heat    []  Ice     []  Heat  []  Ice massage Position:  Location:    []  Laser  []  Other: Position:  Location:     15 [x]  Vasopneumatic Device Pressure:       [] lo [x] med [] hi   Temperature: 34 degrees     [x] Skin assessment post-treatment:  [x]intact []redness- no adverse reaction    []redness  adverse reaction:     15 min Therapeutic Exercise:  [x] See flow sheet :   Rationale: increase ROM and increase strength to improve the patients ability to bend, walk, run. 25 min Manual Therapy:   Patella mobs  Kathleen scar mobs  Gentle manual extension stretch  Seated flexion PROM (gravity assisted) repeated mid range to end range as tolerated     Rationale: decrease pain, increase ROM and increase tissue extensibility to improve the patients ability to bend, walk, run.     min Gait Training:  ___ feet with ___ device on level surfaces with ___ level of assist   Rationale: increase ROM, increase strength, improve coordination and increase proprioception  to improve the patients ability to walk with normal gait pattern. With   [x] TE   [] TA   [] Neuro   [] SC   [] other: Patient Education: [x] Review HEP    [] Progressed/Changed HEP based on:   [] positioning   [] body mechanics   [] transfers   [] heat/ice application    [] other:      Other Objective/Functional Measures:      Pain Level (0-10 scale) post treatment: 2    ASSESSMENT/Changes in Function: Patient progressing. Flexion stiff initially, but improves with progressive PROM. Patient will continue to benefit from skilled PT services to modify and progress therapeutic interventions, address functional mobility deficits, address ROM deficits, address strength deficits, analyze and address soft tissue restrictions, analyze and cue movement patterns, analyze and modify body mechanics/ergonomics and assess and modify postural abnormalities to attain remaining goals.      []  See Plan of Care  []  See progress note/recertification  []  See Discharge Summary         Progress towards goals / Updated goals:    PLAN  [x]  Upgrade activities as tolerated     [x]  Continue plan of care  []  Update interventions per flow sheet       []  Discharge due to:_  [x]  Follow up at end of the week.      Jordy Barragan, PT 9/30/2021

## 2021-10-01 NOTE — PROGRESS NOTES
MD NOTE - [facility] 2-15    Patient Name: Mandy Silveira  Date:10/1/2021  : 1975  [x]  Patient  Verified  Payor: Ioana Noriega / Plan: 1 [facility] / Product Type: Managed Care Medicaid /      Treatment Area: Left knee pain [M25.562]    SUBJECTIVE  Pain Level (0-10 scale): 2    Subjective functional status/changes:    States knee feels better today, but still very stiff. Continues to work on ROM at home. OBJECTIVE  Effusion:  Mid and supra patella +1.5 cm    PROM:  80 degrees seated flexion (after prolonged gentle progressive stretch)      Pain Level (0-10 scale) post treatment: 2    ASSESSMENT/Changes in Function: Patient progressing. Flexion stiff initially, but improves with progressive PROM. PLAN  [x]  Upgrade activities as tolerated     [x]  Continue plan of care  [x]  Post-op follow up this week.     Carla Milton V, PT 10/1/2021

## 2021-10-05 ENCOUNTER — HOSPITAL ENCOUNTER (OUTPATIENT)
Dept: PHYSICAL THERAPY | Age: 46
Discharge: HOME | End: 2021-10-05
Payer: MEDICAID

## 2021-10-05 PROCEDURE — 97110 THERAPEUTIC EXERCISES: CPT | Performed by: PHYSICAL THERAPIST

## 2021-10-05 PROCEDURE — 97016 VASOPNEUMATIC DEVICE THERAPY: CPT | Performed by: PHYSICAL THERAPIST

## 2021-10-05 PROCEDURE — 97140 MANUAL THERAPY 1/> REGIONS: CPT | Performed by: PHYSICAL THERAPIST

## 2021-10-06 NOTE — PROGRESS NOTES
PT DAILY TREATMENT NOTE - [facility] 2-15    Patient Name: Dee Lino  Date:10/5/2021  : 1975  [x]  Patient  Verified  Payor: Krystle Ivy / Plan: [address] [place] [address] / Product Type: Managed Care Medicaid /    In time:  1:50 pm  Out time: 3:00 pm  Total Treatment Time (min): 70  Total Timed Codes (min): 45  1:1 Treatment Time ([facility] only): 39   Visit #:  5    Treatment Area: Left knee pain [M25.562]    SUBJECTIVE  Pain Level (0-10 scale): 7   Any medication changes, allergies to medications, adverse drug reactions, diagnosis change, or new procedure performed?: [x] No    [] Yes (see summary sheet for update)  Subjective functional status/changes:   [] No changes reported  States MD pleased with progress. \"He wants me off the crutches by the end of the week. I'm really sore, I've been working it. \"    OBJECTIVE  Effusion:     Mid patella +1.5 cm   Supra patella +1 cm    PROM:  90 degrees seated flexion (after prolonged gentle progressive stretch)    Modality rationale: decrease edema, decrease inflammation and decrease pain to improve the patients ability to bend, walk, run.    Min Type Additional Details       [x] Estim: []Att   []Unatt    []TENS instruct                  []IFC  []Premod   []NMES                     [x][place] 10/20 sec on/off  []w/US   []w/ice   []w/heat  Position:  Location:  Left quad       []  Traction: [] Cervical       []Lumbar                       [] Prone          []Supine                       []Intermittent   []Continuous Lbs:  [] before manual  [] after manual  []w/heat    []  Ultrasound: []Continuous   [] Pulsed                       at: []1MHz   []3MHz Location:  W/cm2:    [] Paraffin         Location:   []w/heat    []  Ice     []  Heat  []  Ice massage Position:  Location:    []  Laser  []  Other: Position:  Location:     15 [x]  Vasopneumatic Device Pressure:       [] lo [x] med [] hi   Temperature: 34 degrees     [x] Skin assessment post-treatment:  [x]intact []redness- no adverse reaction    []redness  adverse reaction:     20 min Therapeutic Exercise:  [x] See flow sheet :   Rationale: increase ROM and increase strength to improve the patients ability to bend, walk, run. 25 min Manual Therapy:   Patella mobs  [place] scar mobs  Gentle manual extension stretch  Seated flexion PROM (gravity assisted) repeated mid range to end range as tolerated     Rationale: decrease pain, increase ROM and increase tissue extensibility to improve the patients ability to bend, walk, run.     min Gait Training:  ___ feet with ___ device on level surfaces with ___ level of assist   Rationale: increase ROM, increase strength, improve coordination and increase proprioception  to improve the patients ability to walk with normal gait pattern. With   [x] TE   [] TA   [] Neuro   [] SC   [] other: Patient Education: [x] Review HEP    [] Progressed/Changed HEP based on:   [] positioning   [] body mechanics   [] transfers   [] heat/ice application    [] other:      Other Objective/Functional Measures:      Pain Level (0-10 scale) post treatment: 2    ASSESSMENT/Changes in Function: Increased PROM, quad control slightly improved. Patient will continue to benefit from skilled PT services to modify and progress therapeutic interventions, address functional mobility deficits, address ROM deficits, address strength deficits, analyze and address soft tissue restrictions, analyze and cue movement patterns, analyze and modify body mechanics/ergonomics and assess and modify postural abnormalities to attain remaining goals. []  See Plan of Care  []  See progress note/recertification  []  See Discharge Summary         Progress towards goals / Updated goals:    PLAN  [x]  Upgrade activities as tolerated     [x]  Continue plan of care  []  Update interventions per flow sheet       []  Discharge due to:_  [x]  Progress ROM, strength, and gait.   Wean crutches as tolerated.     Mary Ryder V, PT 10/5/2021

## 2021-10-07 ENCOUNTER — HOSPITAL ENCOUNTER (OUTPATIENT)
Dept: PHYSICAL THERAPY | Age: 46
Discharge: HOME | End: 2021-10-07
Payer: MEDICAID

## 2021-10-07 PROCEDURE — 97032 APPL MODALITY 1+ESTIM EA 15: CPT | Performed by: PHYSICAL THERAPIST

## 2021-10-07 PROCEDURE — 97110 THERAPEUTIC EXERCISES: CPT | Performed by: PHYSICAL THERAPIST

## 2021-10-07 PROCEDURE — 97016 VASOPNEUMATIC DEVICE THERAPY: CPT | Performed by: PHYSICAL THERAPIST

## 2021-10-07 PROCEDURE — 97140 MANUAL THERAPY 1/> REGIONS: CPT | Performed by: PHYSICAL THERAPIST

## 2021-10-08 NOTE — PROGRESS NOTES
PT DAILY TREATMENT NOTE - [facility] 2-15    Patient Name: Teagan Alarcon  Date:10/7/2021  : 1975  [x]  Patient  Verified  Payor: Jose Antonio Casillas / Plan: [address] [place] [id number] / Product Type: Managed Care Medicaid /    In time:  12:05 pm  Out time: 1:20 pm  Total Treatment Time (min): 75  Total Timed Codes (min): 45  1:1 Treatment Time ( [address] only): 45   Visit #:  6    Treatment Area: Left knee pain [M25.562]    SUBJECTIVE  Pain Level (0-10 scale): 4  Any medication changes, allergies to medications, adverse drug reactions, diagnosis change, or new procedure performed?: [x] No    [] Yes (see summary sheet for update)  Subjective functional status/changes:   [] No changes reported  \"It hurts below the knee today. \"    OBJECTIVE  Effusion:     Mid patella +1 cm   Supra patella +1 cm    PROM:  80 degrees seated flexion (after prolonged gentle progressive stretch)    Modality rationale: decrease edema, decrease inflammation and decrease pain to improve the patients ability to bend, walk, run.    Min Type Additional Details      10 [x] Estim: []Att   []Unatt    []TENS instruct                  []IFC  []Premod   []NMES                     [x]Ecuadorean 10/20 sec on/off  []w/US   []w/ice   []w/heat  Position:  Long sitting  Location:  Left quad       []  Traction: [] Cervical       []Lumbar                       [] Prone          []Supine                       []Intermittent   []Continuous Lbs:  [] before manual  [] after manual  []w/heat    []  Ultrasound: []Continuous   [] Pulsed                       at: []1MHz   []3MHz Location:  W/cm2:    [] Paraffin         Location:   []w/heat    []  Ice     []  Heat  []  Ice massage Position:  Location:    []  Laser  []  Other: Position:  Location:     15 [x]  Vasopneumatic Device Pressure:       [] lo [x] med [] hi   Temperature: 34 degrees     [x] Skin assessment post-treatment:  [x]intact []redness- no adverse reaction    []redness  adverse reaction: 20 min Therapeutic Exercise:  [x] See flow sheet :   Rationale: increase ROM and increase strength to improve the patients ability to bend, walk, run. 25 min Manual Therapy:   Patella mobs  Kathleen scar mobs  Gentle manual extension stretch  Seated flexion PROM (gravity assisted) repeated mid range to end range as tolerated     Rationale: decrease pain, increase ROM and increase tissue extensibility to improve the patients ability to bend, walk, run.     min Gait Training:  ___ feet with ___ device on level surfaces with ___ level of assist   Rationale: increase ROM, increase strength, improve coordination and increase proprioception  to improve the patients ability to walk with normal gait pattern. With   [x] TE   [] TA   [] Neuro   [] SC   [] other: Patient Education: [x] Review HEP    [] Progressed/Changed HEP based on:   [] positioning   [] body mechanics   [] transfers   [] heat/ice application    [] other:      Other Objective/Functional Measures:      Pain Level (0-10 scale) post treatment: 2    ASSESSMENT/Changes in Function: Tight today. Patient will continue to benefit from skilled PT services to modify and progress therapeutic interventions, address functional mobility deficits, address ROM deficits, address strength deficits, analyze and address soft tissue restrictions, analyze and cue movement patterns, analyze and modify body mechanics/ergonomics and assess and modify postural abnormalities to attain remaining goals. []  See Plan of Care  []  See progress note/recertification  []  See Discharge Summary         Progress towards goals / Updated goals:    PLAN  [x]  Upgrade activities as tolerated     [x]  Continue plan of care  []  Update interventions per flow sheet       []  Discharge due to:_  [x]  Progress ROM, strength, and gait. Wean crutches as tolerated.     Alba Huertas V, PT 10/7/2021

## 2021-10-11 ENCOUNTER — HOSPITAL ENCOUNTER (OUTPATIENT)
Dept: PHYSICAL THERAPY | Age: 46
Discharge: HOME | End: 2021-10-11
Payer: MEDICAID

## 2021-10-11 PROCEDURE — 97016 VASOPNEUMATIC DEVICE THERAPY: CPT | Performed by: PHYSICAL THERAPIST

## 2021-10-11 PROCEDURE — 97110 THERAPEUTIC EXERCISES: CPT | Performed by: PHYSICAL THERAPIST

## 2021-10-11 PROCEDURE — 97140 MANUAL THERAPY 1/> REGIONS: CPT | Performed by: PHYSICAL THERAPIST

## 2021-10-11 NOTE — PROGRESS NOTES
PT DAILY TREATMENT NOTE - [facility] 2-15    Patient Name: Siria Padilla  Date:10/11/2021  : 1975  [x]  Patient  Verified  Payor: Charline Room / Plan: [facility] / Product Type: Managed Care Medicaid /    In time:  4:15 pm  Out time: 5:25 pm  Total Treatment Time (min): 70  Total Timed Codes (min): 45  1:1 Treatment Time ( [address] only): 39   Visit #:  7    Treatment Area: Left knee pain [M25.562]    SUBJECTIVE  Pain Level (0-10 scale): 4  Any medication changes, allergies to medications, adverse drug reactions, diagnosis change, or new procedure performed?: [x] No    [] Yes (see summary sheet for update)  Subjective functional status/changes:   [] No changes reported  States sore and stiff. 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 OBJECTIVE  Effusion:     Mid patella +1.5 cm   Supra patella +1.5 cm    PROM:  81 degrees seated flexion (after prolonged gentle progressive stretch)    Modality rationale: decrease edema, decrease inflammation and decrease pain to improve the patients ability to bend, walk, run.    Min Type Additional Details       [x] Estim: []Att   []Unatt    []TENS instruct                  []IFC  []Premod   []NMES                     [x]Vatican citizen 10/20 sec on/off  []w/US   []w/ice   []w/heat  Position:  Long sitting  Location:  Left quad       []  Traction: [] Cervical       []Lumbar                       [] Prone          []Supine                       []Intermittent   []Continuous Lbs:  [] before manual  [] after manual  []w/heat    []  Ultrasound: []Continuous   [] Pulsed                       at: []1MHz   []3MHz Location:  W/cm2:    [] Paraffin         Location:   []w/heat    []  Ice     []  Heat  []  Ice massage Position:  Location:    []  Laser  []  Other: Position:  Location:     15 [x]  Vasopneumatic Device Pressure:       [] lo [x] med [] hi   Temperature: 34 degrees     [x] Skin assessment post-treatment:  [x]intact []redness- no adverse reaction    []redness  adverse reaction:     20 min Therapeutic Exercise:  [x] See flow sheet :   Rationale: increase ROM and increase strength to improve the patients ability to bend, walk, run. 25 min Manual Therapy:   Patella mobs  Kathleen scar mobs  Gentle manual extension stretch  Seated flexion PROM (gravity assisted) repeated mid range to end range as tolerated     Rationale: decrease pain, increase ROM and increase tissue extensibility to improve the patients ability to bend, walk, run.     min Gait Training:  ___ feet with ___ device on level surfaces with ___ level of assist   Rationale: increase ROM, increase strength, improve coordination and increase proprioception  to improve the patients ability to walk with normal gait pattern. With   [x] TE   [] TA   [] Neuro   [] SC   [] other: Patient Education: [x] Review HEP    [] Progressed/Changed HEP based on:   [] positioning   [] body mechanics   [] transfers   [] heat/ice application    [] other:      Other Objective/Functional Measures:      Pain Level (0-10 scale) post treatment: 3    ASSESSMENT/Changes in Function: Increased pain and swelling today. Patient will continue to benefit from skilled PT services to modify and progress therapeutic interventions, address functional mobility deficits, address ROM deficits, address strength deficits, analyze and address soft tissue restrictions, analyze and cue movement patterns, analyze and modify body mechanics/ergonomics and assess and modify postural abnormalities to attain remaining goals. []  See Plan of Care  []  See progress note/recertification  []  See Discharge Summary         Progress towards goals / Updated goals:    PLAN  [x]  Upgrade activities as tolerated     [x]  Continue plan of care  []  Update interventions per flow sheet       []  Discharge due to:_  [x]  Progress ROM, strength, and gait. Wean crutches as tolerated.     Bree Yepez, PT 10/11/2021

## 2021-10-14 ENCOUNTER — HOSPITAL ENCOUNTER (OUTPATIENT)
Dept: PHYSICAL THERAPY | Age: 46
Discharge: HOME | End: 2021-10-14
Payer: MEDICAID

## 2021-10-14 PROCEDURE — 97140 MANUAL THERAPY 1/> REGIONS: CPT | Performed by: PHYSICAL THERAPIST

## 2021-10-14 PROCEDURE — 97110 THERAPEUTIC EXERCISES: CPT | Performed by: PHYSICAL THERAPIST

## 2021-10-14 NOTE — PROGRESS NOTES
PT DAILY TREATMENT NOTE - [facility] 2-15    Patient Name: Dougie Christy  Date:10/14/2021  : 1975  [x]  Patient  Verified  Payor: [employer]ICAID / Plan: Tien Chao / Product Type: Managed Care Medicaid /    In time:  12:15 pm  Out time: 1:30 pm  Total Treatment Time (min): 75  Total Timed Codes (min): 55  1:1 Treatment Time ( [address] only): 55   Visit #:  8    Treatment Area: Left knee pain [M25.562]    SUBJECTIVE  Pain Level (0-10 scale): 2  Any medication changes, allergies to medications, adverse drug reactions, diagnosis change, or new procedure performed?: [x] No    [] Yes (see summary sheet for update)  Subjective functional status/changes:   [] No changes reported  States able to sleep last night. Knee feels better since taking pain medication. \"I'm not using crutches today. \"    OBJECTIVE  Improved stance phase left LE without crutches    Effusion:     Mid patella +.5 cm   Supra patella +.5 cm    PROM:  85 degrees seated flexion (after prolonged gentle progressive stretch)    Modality rationale: decrease edema, decrease inflammation and decrease pain to improve the patients ability to bend, walk, run.    Min Type Additional Details       [x] Estim: []Att   []Unatt    []TENS instruct                  []IFC  []Premod   []NMES                     [x]Cymraes 10/20 sec on/off  []w/US   []w/ice   []w/heat  Position:  Long sitting  Location:  Left quad       []  Traction: [] Cervical       []Lumbar                       [] Prone          []Supine                       []Intermittent   []Continuous Lbs:  [] before manual  [] after manual  []w/heat    []  Ultrasound: []Continuous   [] Pulsed                       at: []1MHz   []3MHz Location:  W/cm2:    [] Paraffin         Location:   []w/heat    []  Ice     []  Heat  []  Ice massage Position:  Location:    []  Laser  []  Other: Position:  Location:     15 [x]  Vasopneumatic Device Pressure:       [] lo [x] med [] hi   Temperature: 34 degrees     [x] Skin assessment post-treatment:  [x]intact []redness- no adverse reaction    []redness  adverse reaction:     25 min Therapeutic Exercise:  [x] See flow sheet :   Rationale: increase ROM and increase strength to improve the patients ability to bend, walk, run. 30 min Manual Therapy:   Patella mobs  Kathleen scar mobs  Gentle manual extension stretch  Seated flexion PROM (gravity assisted) repeated mid range to end range as tolerated     Rationale: decrease pain, increase ROM and increase tissue extensibility to improve the patients ability to bend, walk, run.     min Gait Training:  ___ feet with ___ device on level surfaces with ___ level of assist   Rationale: increase ROM, increase strength, improve coordination and increase proprioception  to improve the patients ability to walk with normal gait pattern. With   [x] TE   [] TA   [] Neuro   [] SC   [] other: Patient Education: [x] Review HEP    [] Progressed/Changed HEP based on:   [] positioning   [] body mechanics   [] transfers   [] heat/ice application    [] other:      Other Objective/Functional Measures:      Pain Level (0-10 scale) post treatment: 1    ASSESSMENT/Changes in Function: Decreased pain, decreased swelling, increased PROM today. Patient able to do limited reps of active SLR's today, with quad lag noted. Patient will continue to benefit from skilled PT services to modify and progress therapeutic interventions, address functional mobility deficits, address ROM deficits, address strength deficits, analyze and address soft tissue restrictions, analyze and cue movement patterns, analyze and modify body mechanics/ergonomics and assess and modify postural abnormalities to attain remaining goals.      []  See Plan of Care  []  See progress note/recertification  []  See Discharge Summary         Progress towards goals / Updated goals:    PLAN  [x]  Upgrade activities as tolerated     [x]  Continue plan of care  []  Update interventions per flow sheet       []  Discharge due to:_  [x]  Progress ROM, strength, and gait.       Debbie Canchola V, PT 10/14/2021

## 2021-10-18 ENCOUNTER — HOSPITAL ENCOUNTER (OUTPATIENT)
Dept: PHYSICAL THERAPY | Age: 46
Discharge: HOME | End: 2021-10-18
Payer: MEDICAID

## 2021-10-18 PROCEDURE — 97140 MANUAL THERAPY 1/> REGIONS: CPT | Performed by: PHYSICAL THERAPIST

## 2021-10-18 PROCEDURE — 97014 ELECTRIC STIMULATION THERAPY: CPT | Performed by: PHYSICAL THERAPIST

## 2021-10-18 PROCEDURE — 97110 THERAPEUTIC EXERCISES: CPT | Performed by: PHYSICAL THERAPIST

## 2021-10-18 NOTE — PROGRESS NOTES
PT DAILY TREATMENT NOTE - [facility] 2-15    Patient Name: Kenton Gaitan  Date:10/18/2021  : 1975  [x]  Patient  Verified  Payor: Arulisses Gills / Plan: [address] [place] [address] / Product Type: Managed Care Medicaid /    In time:  12:25 pm  Out time: 1:40 pm  Total Treatment Time (min): 75  Total Timed Codes (min): 55  1:1 Treatment Time ( [address] only): 55   Visit #:  9    Treatment Area: Left knee pain [M25.562]    SUBJECTIVE  Pain Level (0-10 scale): 2  Any medication changes, allergies to medications, adverse drug reactions, diagnosis change, or new procedure performed?: [x] No    [] Yes (see summary sheet for update)  Subjective functional status/changes:   [] No changes reported  \"It feels better. I can do the leg raises a lot better on my own. \"    OBJECTIVE  Improved stance phase left LE without crutches    Effusion:     Mid patella +.5 cm   Supra patella +.5 cm    PROM:  87 degrees seated flexion (after prolonged gentle progressive stretch)    Modality rationale: decrease edema, decrease inflammation and decrease pain to improve the patients ability to bend, walk, run.    Min Type Additional Details       [x] Estim: []Att   []Unatt    []TENS instruct                  []IFC  []Premod   []NMES                     [x]Portuguese 10/20 sec on/off  []w/US   []w/ice   []w/heat  Position:  Long sitting  Location:  Left quad       []  Traction: [] Cervical       []Lumbar                       [] Prone          []Supine                       []Intermittent   []Continuous Lbs:  [] before manual  [] after manual  []w/heat    []  Ultrasound: []Continuous   [] Pulsed                       at: []1MHz   []3MHz Location:  W/cm2:    [] Paraffin         Location:   []w/heat    []  Ice     []  Heat  []  Ice massage Position:  Location:    []  Laser  []  Other: Position:  Location:     15 [x]  Vasopneumatic Device Pressure:       [] lo [x] med [] hi   Temperature: 34 degrees     [x] Skin assessment post-treatment: [x]intact []redness- no adverse reaction    []redness  adverse reaction:     25 min Therapeutic Exercise:  [x] See flow sheet :   Rationale: increase ROM and increase strength to improve the patients ability to bend, walk, run. 30 min Manual Therapy:   Patella mobs  Katlheen scar mobs  Gentle manual extension stretch  Seated flexion PROM (gravity assisted) repeated mid range to end range as tolerated     Rationale: decrease pain, increase ROM and increase tissue extensibility to improve the patients ability to bend, walk, run.     min Gait Training:  ___ feet with ___ device on level surfaces with ___ level of assist   Rationale: increase ROM, increase strength, improve coordination and increase proprioception  to improve the patients ability to walk with normal gait pattern. With   [x] TE   [] TA   [] Neuro   [] SC   [] other: Patient Education: [x] Review HEP    [] Progressed/Changed HEP based on:   [] positioning   [] body mechanics   [] transfers   [] heat/ice application    [] other:      Other Objective/Functional Measures:      Pain Level (0-10 scale) post treatment: 1    ASSESSMENT/Changes in Function: Improved quad control noted. Patient will continue to benefit from skilled PT services to modify and progress therapeutic interventions, address functional mobility deficits, address ROM deficits, address strength deficits, analyze and address soft tissue restrictions, analyze and cue movement patterns, analyze and modify body mechanics/ergonomics and assess and modify postural abnormalities to attain remaining goals. []  See Plan of Care  []  See progress note/recertification  []  See Discharge Summary         Progress towards goals / Updated goals:    PLAN  [x]  Upgrade activities as tolerated     [x]  Continue plan of care  []  Update interventions per flow sheet       []  Discharge due to:_  [x]  Progress ROM, strength, and gait.       Mary Ryder V, PT 10/18/2021

## 2021-10-21 ENCOUNTER — HOSPITAL ENCOUNTER (OUTPATIENT)
Dept: PHYSICAL THERAPY | Age: 46
Discharge: HOME | End: 2021-10-21
Payer: MEDICAID

## 2021-10-21 PROCEDURE — 97110 THERAPEUTIC EXERCISES: CPT | Performed by: PHYSICAL THERAPIST

## 2021-10-21 PROCEDURE — 97140 MANUAL THERAPY 1/> REGIONS: CPT | Performed by: PHYSICAL THERAPIST

## 2021-10-21 PROCEDURE — 97016 VASOPNEUMATIC DEVICE THERAPY: CPT | Performed by: PHYSICAL THERAPIST

## 2021-10-22 NOTE — PROGRESS NOTES
PT DAILY TREATMENT NOTE - [facility] 2-15    Patient Name: Delia Carson  Date:10/21/2021  : 1975  [x]  Patient  Verified  Payor: BLUE [employer]OSS MEDICAID / Plan: [facility] Natasha Marcos / Product Type: Managed Care Medicaid /    In time:  12:15 pm  Out time: 1:30 pm  Total Treatment Time (min): 75  Total Timed Codes (min): 55  1:1 Treatment Time ( [address] only): 55   Visit #:  10    Treatment Area: Left knee pain [M25.562]    SUBJECTIVE  Pain Level (0-10 scale): 3  Any medication changes, allergies to medications, adverse drug reactions, diagnosis change, or new procedure performed?: [x] No    [] Yes (see summary sheet for update)  Subjective functional status/changes:   [] No changes reported  \"I've been stretching it all morning. I've been doing a lot of leg raises. \"    OBJECTIVE  Improved SLR's    Effusion:     Mid patella +1 cm   Supra patella +.5 cm    PROM:  91 degrees seated flexion (after prolonged gentle progressive stretch)    Modality rationale: decrease edema, decrease inflammation and decrease pain to improve the patients ability to bend, walk, run.    Min Type Additional Details       [x] Estim: []Att   []Unatt    []TENS instruct                  []IFC  []Premod   []NMES                     [x]Greek 10/20 sec on/off  []w/US   []w/ice   []w/heat  Position:  Long sitting  Location:  Left quad       []  Traction: [] Cervical       []Lumbar                       [] Prone          []Supine                       []Intermittent   []Continuous Lbs:  [] before manual  [] after manual  []w/heat    []  Ultrasound: []Continuous   [] Pulsed                       at: []1MHz   []3MHz Location:  W/cm2:    [] Paraffin         Location:   []w/heat    []  Ice     []  Heat  []  Ice massage Position:  Location:    []  Laser  []  Other: Position:  Location:     15 [x]  Vasopneumatic Device Pressure:       [] lo [x] med [] hi   Temperature: 34 degrees     [x] Skin assessment post-treatment:  [x]intact []redness- no adverse reaction    []redness  adverse reaction:     25 min Therapeutic Exercise:  [x] See flow sheet :   Rationale: increase ROM and increase strength to improve the patients ability to bend, walk, run. 30 min Manual Therapy:   Patella mobs  Kathleen scar mobs  Gentle manual extension stretch  Seated flexion PROM (gravity assisted) repeated mid range to end range as tolerated     Rationale: decrease pain, increase ROM and increase tissue extensibility to improve the patients ability to bend, walk, run.     min Gait Training:  ___ feet with ___ device on level surfaces with ___ level of assist   Rationale: increase ROM, increase strength, improve coordination and increase proprioception  to improve the patients ability to walk with normal gait pattern. With   [x] TE   [] TA   [] Neuro   [] SC   [] other: Patient Education: [x] Review HEP    [] Progressed/Changed HEP based on:   [] positioning   [] body mechanics   [] transfers   [] heat/ice application    [] other:      Other Objective/Functional Measures:      Pain Level (0-10 scale) post treatment: 2    ASSESSMENT/Changes in Function: Improved quad control. Slightly improved PROM. Patient will continue to benefit from skilled PT services to modify and progress therapeutic interventions, address functional mobility deficits, address ROM deficits, address strength deficits, analyze and address soft tissue restrictions, analyze and cue movement patterns, analyze and modify body mechanics/ergonomics and assess and modify postural abnormalities to attain remaining goals. []  See Plan of Care  []  See progress note/recertification  []  See Discharge Summary         Progress towards goals / Updated goals:    PLAN  [x]  Upgrade activities as tolerated     [x]  Continue plan of care  []  Update interventions per flow sheet       []  Discharge due to:_  [x]  Progress ROM, strength, and gait.       Melinda Engel V, PT 10/21/2021

## 2021-10-25 ENCOUNTER — HOSPITAL ENCOUNTER (OUTPATIENT)
Dept: PHYSICAL THERAPY | Age: 46
Discharge: HOME | End: 2021-10-25
Payer: MEDICAID

## 2021-10-25 PROCEDURE — 97140 MANUAL THERAPY 1/> REGIONS: CPT | Performed by: PHYSICAL THERAPIST

## 2021-10-25 PROCEDURE — 97016 VASOPNEUMATIC DEVICE THERAPY: CPT | Performed by: PHYSICAL THERAPIST

## 2021-10-25 PROCEDURE — 97110 THERAPEUTIC EXERCISES: CPT | Performed by: PHYSICAL THERAPIST

## 2021-10-25 NOTE — PROGRESS NOTES
PT PROGRESS NOTE - [facility] 2-15    Patient Name: Bk Slaughter  Date:10/25/2021  : 1975  [x]  Patient  Verified  Payor: Stefano Carne / Plan: [facility] [employer] / Product Type: Managed Care Medicaid /    In time:  1:15 pm  Out time: 2:35 pm  Total Treatment Time (min): 80  Total Timed Codes (min): 55  1:1 Treatment Time ( [address] only): 55   Visit #:  11    Treatment Area: Left knee pain [M25.562]    SUBJECTIVE  Pain Level (0-10 scale): 2  Any medication changes, allergies to medications, adverse drug reactions, diagnosis change, or new procedure performed?: [x] No    [] Yes (see summary sheet for update)  Subjective functional status/changes:   [] No changes reported  \"The scar on the inside was really red this weekend. I put peroxide and antibiotic ointment on it. It looks a lot better. \"    OBJECTIVE  Local redness at anteromedial portal scar     Effusion:     Mid patella +1 cm   Supra patella +1 cm    AROM:  0 to 85 degrees (seated flexion)    PROM:  99 degrees seated flexion (after prolonged gentle progressive stretch)    Modality rationale: decrease edema, decrease inflammation and decrease pain to improve the patients ability to bend, walk, run.    Min Type Additional Details       [x] Estim: []Att   []Unatt    []TENS instruct                  []IFC  []Premod   []NMES                     [x]Mongolian 10/20 sec on/off  []w/US   []w/ice   []w/heat  Position:  Long sitting  Location:  Left quad       []  Traction: [] Cervical       []Lumbar                       [] Prone          []Supine                       []Intermittent   []Continuous Lbs:  [] before manual  [] after manual  []w/heat    []  Ultrasound: []Continuous   [] Pulsed                       at: []1MHz   []3MHz Location:  W/cm2:    [] Paraffin         Location:   []w/heat    []  Ice     []  Heat  []  Ice massage Position:  Location:    []  Laser  []  Other: Position:  Location:     15 [x]  Vasopneumatic Device Pressure:       [] lo [x] med [] hi   Temperature: 34 degrees     [x] Skin assessment post-treatment:  [x]intact []redness- no adverse reaction    []redness  adverse reaction:     20 min Therapeutic Exercise:  [x] See flow sheet :   Rationale: increase ROM and increase strength to improve the patients ability to bend, walk, run. 40 min Manual Therapy:   Patella mobs  Kathleen scar mobs  Gentle manual extension stretch  Seated flexion PROM (gravity assisted) repeated mid range to end range as tolerated     Rationale: decrease pain, increase ROM and increase tissue extensibility to improve the patients ability to bend, walk, run.     min Gait Training:  ___ feet with ___ device on level surfaces with ___ level of assist   Rationale: increase ROM, increase strength, improve coordination and increase proprioception  to improve the patients ability to walk with normal gait pattern. With   [x] TE   [] TA   [] Neuro   [] SC   [] other: Patient Education: [x] Review HEP    [] Progressed/Changed HEP based on:   [] positioning   [] body mechanics   [] transfers   [] heat/ice application    [] other:      Other Objective/Functional Measures:      Pain Level (0-10 scale) post treatment: 3    ASSESSMENT/Changes in Function: Increased flexion PROM. Limited by pain and stiffness at end range. Patient will continue to benefit from skilled PT services to modify and progress therapeutic interventions, address functional mobility deficits, address ROM deficits, address strength deficits, analyze and address soft tissue restrictions, analyze and cue movement patterns, analyze and modify body mechanics/ergonomics and assess and modify postural abnormalities to attain remaining goals. []  See Plan of Care  []  See progress note/recertification  []  See Discharge Summary         Progress towards goals / Updated goals:  Short Term Goals:   1. AROM left knee 0 to 115 to improve functional bending. Improved, but not met. 2.   PROM +1 to 120 degrees to improve. Improved, but not met. PLAN  [x]  Upgrade activities as tolerated     [x]  Continue plan of care  []  Update interventions per flow sheet       []  Discharge due to:_  [x]  Progress ROM, strength, and gait. MD follow up next week.     Stefano Maynard V, PT 10/25/2021

## 2021-10-28 ENCOUNTER — HOSPITAL ENCOUNTER (OUTPATIENT)
Dept: PHYSICAL THERAPY | Age: 46
Discharge: HOME | End: 2021-10-28
Payer: MEDICAID

## 2021-10-28 PROCEDURE — 97140 MANUAL THERAPY 1/> REGIONS: CPT | Performed by: PHYSICAL THERAPIST

## 2021-10-28 PROCEDURE — 97016 VASOPNEUMATIC DEVICE THERAPY: CPT | Performed by: PHYSICAL THERAPIST

## 2021-10-28 PROCEDURE — 97110 THERAPEUTIC EXERCISES: CPT | Performed by: PHYSICAL THERAPIST

## 2021-10-28 NOTE — PROGRESS NOTES
PT DAILY TREATMENT NOTE - [facility] 2-15    Patient Name: Jeff Busby  Date:10/28/2021  : 1975  [x]  Patient  Verified  Payor: Cata Herrera / Plan: [address] [place] [address] / Product Type: Managed Care Medicaid /    In time:  12:25 pm  Out time: 1:40 pm  Total Treatment Time (min): 75  Total Timed Codes (min): 55  1:1 Treatment Time ( [address] only): 55   Visit #:  12    Treatment Area: Left knee pain [M25.562]    SUBJECTIVE  Pain Level (0-10 scale): 2  Any medication changes, allergies to medications, adverse drug reactions, diagnosis change, or new procedure performed?: [x] No    [] Yes (see summary sheet for update)  Subjective functional status/changes:   [] No changes reported  \"You killed me. It was really sore the next day. \"    OBJECTIVE    Effusion:     Mid patella +1 cm   Supra patella +1 cm    AROM:  0 to 85 degrees (seated flexion)    PROM:  101 degrees seated flexion (after manual therapy and prolonged progressive stretch)    Modality rationale: decrease edema, decrease inflammation and decrease pain to improve the patients ability to bend, walk, run.    Min Type Additional Details       [x] Estim: []Att   []Unatt    []TENS instruct                  []IFC  []Premod   []NMES                     [x]Trinidadian 10/20 sec on/off  []w/US   []w/ice   []w/heat  Position:  Long sitting  Location:  Left quad       []  Traction: [] Cervical       []Lumbar                       [] Prone          []Supine                       []Intermittent   []Continuous Lbs:  [] before manual  [] after manual  []w/heat    []  Ultrasound: []Continuous   [] Pulsed                       at: []1MHz   []3MHz Location:  W/cm2:    [] Paraffin         Location:   []w/heat    []  Ice     []  Heat  []  Ice massage Position:  Location:    []  Laser  []  Other: Position:  Location:     15 [x]  Vasopneumatic Device Pressure:       [] lo [x] med [] hi   Temperature: 34 degrees     [x] Skin assessment post-treatment:  [x]intact []redness- no adverse reaction    []redness  adverse reaction:     15 min Therapeutic Exercise:  [x] See flow sheet :   Rationale: increase ROM and increase strength to improve the patients ability to bend, walk, run. 40 min Manual Therapy:   Patella mobs  Kathleen scar mobs  Manual extension stretch  Seated flexion PROM (gravity assisted) repeated mid range to end range as tolerated    STM left quad  Supine rectus femoris stretch  Prone quad stretch with pre-stretch on wedge   Rationale: decrease pain, increase ROM and increase tissue extensibility to improve the patients ability to bend, walk, run.     min Gait Training:  ___ feet with ___ device on level surfaces with ___ level of assist   Rationale: increase ROM, increase strength, improve coordination and increase proprioception  to improve the patients ability to walk with normal gait pattern. With   [x] TE   [] TA   [] Neuro   [] SC   [] other: Patient Education: [x] Review HEP    [] Progressed/Changed HEP based on:   [] positioning   [] body mechanics   [] transfers   [] heat/ice application    [] other:      Other Objective/Functional Measures:      Pain Level (0-10 scale) post treatment: 2    ASSESSMENT/Changes in Function: Increased ROM today. Pain and stiffness limit her motion and progress. Patient will continue to benefit from skilled PT services to modify and progress therapeutic interventions, address functional mobility deficits, address ROM deficits, address strength deficits, analyze and address soft tissue restrictions, analyze and cue movement patterns, analyze and modify body mechanics/ergonomics and assess and modify postural abnormalities to attain remaining goals. []  See Plan of Care  []  See progress note/recertification  []  See Discharge Summary         Progress towards goals / Updated goals:  Short Term Goals:   1. AROM left knee 0 to 115 to improve functional bending. Improved, but not met. 2.   PROM +1 to 120 degrees to improve. Improved, but not met. PLAN  [x]  Upgrade activities as tolerated     [x]  Continue plan of care  []  Update interventions per flow sheet       []  Discharge due to:_  [x]  Progress ROM, strength, and gait. MD follow up next week.     Keith Fung V, PT 10/28/2021

## 2021-11-01 ENCOUNTER — HOSPITAL ENCOUNTER (OUTPATIENT)
Dept: PHYSICAL THERAPY | Age: 46
Discharge: HOME | End: 2021-11-01
Payer: MEDICAID

## 2021-11-01 PROCEDURE — 97110 THERAPEUTIC EXERCISES: CPT | Performed by: PHYSICAL THERAPIST

## 2021-11-01 PROCEDURE — 97140 MANUAL THERAPY 1/> REGIONS: CPT | Performed by: PHYSICAL THERAPIST

## 2021-11-01 PROCEDURE — 97016 VASOPNEUMATIC DEVICE THERAPY: CPT | Performed by: PHYSICAL THERAPIST

## 2021-11-01 NOTE — PROGRESS NOTES
PT DAILY TREATMENT NOTE - [facility] 2-15    Patient Name: Nicole Mccormack  Date:2021  : 1975  [x]  Patient  Verified  Payor: [employer]ICA[employer] / [facility] / Product Type: Managed Care Medicaid /    In time:  1:15 pm  Out time: 2:30 pm  Total Treatment Time (min): 75  Total Timed Codes (min): 55  1:1 Treatment Time ( [address] only): 55   Visit #:  13    Treatment Area: Left knee pain [M25.562]    SUBJECTIVE  Pain Level (0-10 scale): 2  Any medication changes, allergies to medications, adverse drug reactions, diagnosis change, or new procedure performed?: [x] No    [] Yes (see summary sheet for update)  Subjective functional status/changes:   [] No changes reported  States knee beginning to feel better since taking Medrol Dosepak. Continues to work hard at home on ROM and strength. OBJECTIVE  Effusion:     Mid patella +1 cm   Supra patella +1/2 cm    AROM:  0 to 90 degrees (seated flexion)    PROM:  105 degrees seated flexion (after manual therapy and prolonged progressive stretch)    Gait:  Decreased knee flexion in swing phase    Modality rationale: decrease edema, decrease inflammation and decrease pain to improve the patients ability to bend, walk, run.    Min Type Additional Details       [x] Estim: []Att   []Unatt    []TENS instruct                  []IFC  []Premod   []NMES                     [x]Somali 10/20 sec on/off  []w/US   []w/ice   []w/heat  Position:  Long sitting  Location:  Left quad       []  Traction: [] Cervical       []Lumbar                       [] Prone          []Supine                       []Intermittent   []Continuous Lbs:  [] before manual  [] after manual  []w/heat    []  Ultrasound: []Continuous   [] Pulsed                       at: []1MHz   []3MHz Location:  W/cm2:    [] Paraffin         Location:   []w/heat    []  Ice     []  Heat  []  Ice massage Position:  Location:    []  Laser  []  Other: Position:  Location:     15 [x]  Vasopneumatic Device Pressure:       [] lo [x] med [] hi   Temperature: 34 degrees     [x] Skin assessment post-treatment:  [x]intact []redness- no adverse reaction    []redness  adverse reaction:     15 min Therapeutic Exercise:  [x] See flow sheet :   Rationale: increase ROM and increase strength to improve the patients ability to bend, walk, run. 45 min Manual Therapy:   Patella mobs  Kathleen scar mobs  Manual extension stretch  Seated flexion PROM (gravity assisted) repeated mid range to end range as tolerated    STM left quad  Supine rectus femoris stretch  Prone quad stretch with pre-stretch on wedge   Rationale: decrease pain, increase ROM and increase tissue extensibility to improve the patients ability to bend, walk, run.     min Gait Training:  ___ feet with ___ device on level surfaces with ___ level of assist   Rationale: increase ROM, increase strength, improve coordination and increase proprioception  to improve the patients ability to walk with normal gait pattern. With   [x] TE   [] TA   [] Neuro   [] SC   [] other: Patient Education: [x] Review HEP    [] Progressed/Changed HEP based on:   [] positioning   [] body mechanics   [] transfers   [] heat/ice application    [] other:      Other Objective/Functional Measures:      Pain Level (0-10 scale) post treatment: 1    ASSESSMENT/Changes in Function:  Increased ROM today. Patient will continue to benefit from skilled PT services to modify and progress therapeutic interventions, address functional mobility deficits, address ROM deficits, address strength deficits, analyze and address soft tissue restrictions, analyze and cue movement patterns, analyze and modify body mechanics/ergonomics and assess and modify postural abnormalities to attain remaining goals. []  See Plan of Care  []  See progress note/recertification  []  See Discharge Summary         Progress towards goals / Updated goals:  Short Term Goals:   1.   AROM left knee 0 to 115 to improve functional bending. Improved, but not met. 2. PROM +1 to 120 degrees to improve. Improved, but not met. PLAN  [x]  Upgrade activities as tolerated     [x]  Continue plan of care  []  Update interventions per flow sheet       []  Discharge due to:_  [x]  Progress ROM, strength, and gait. MD follow up tomorrow.     Kai Lee V, PT 11/1/2021

## 2021-11-02 NOTE — PROGRESS NOTES
MD NOTE - [facility] 2-15    Patient Name: Zita Ax  Date:2021  : 1975  [x]  Patient  Verified  Payor: BLUE CROSS MEDICAID / Plan: [address] [place] [address] / Product Type: Managed Care Medicaid /      Treatment Area: Left knee pain [M25.562]    SUBJECTIVE  Pain Level (0-10 scale): 2  Subjective functional status/changes:     States knee beginning to feel better since taking Medrol Dosepak. Continues to work hard at home on ROM and strength. OBJECTIVE  Effusion:     Mid patella +1 cm   Supra patella +1/2 cm    AROM:  0 to 90 degrees (seated flexion)    PROM:  105 degrees seated flexion (after manual therapy and prolonged progressive stretch)    Gait:  Decreased knee flexion in swing phase    Other Objective/Functional Measures:      Pain Level (0-10 scale) post treatment: 1    ASSESSMENT/Changes in Function:  Increased ROM today. Flexion stiff and painful at end range. PLAN  [x]  Upgrade activities as tolerated     [x]  Continue plan of care  [x]  Progress ROM, strength, and gait. MD follow up tomorrow.     Luis E Vegas V, PT 2021

## 2021-11-04 ENCOUNTER — HOSPITAL ENCOUNTER (OUTPATIENT)
Dept: PHYSICAL THERAPY | Age: 46
Discharge: HOME | End: 2021-11-04
Payer: MEDICAID

## 2021-11-04 PROCEDURE — 97110 THERAPEUTIC EXERCISES: CPT | Performed by: PHYSICAL THERAPIST

## 2021-11-04 PROCEDURE — 97016 VASOPNEUMATIC DEVICE THERAPY: CPT | Performed by: PHYSICAL THERAPIST

## 2021-11-04 PROCEDURE — 97140 MANUAL THERAPY 1/> REGIONS: CPT | Performed by: PHYSICAL THERAPIST

## 2021-11-05 NOTE — PROGRESS NOTES
PT DAILY TREATMENT NOTE - [facility] -15    Patient Name: Jeff Busby  Date:2021  : 1975  [x]  Patient  Verified  Payor: BLUE CROSS MEDICAID / Plan: [facility] [employer] / Product Type: Managed Care Medicaid /    In time:  12:20 pm  Out time: 1:40 pm  Total Treatment Time (min): 80  Total Timed Codes (min): 55  1:1 Treatment Time ( [address] only): 54   Visit #:  14    Treatment Area: Left knee pain [M25.562]    SUBJECTIVE  Pain Level (0-10 scale): 5  Any medication changes, allergies to medications, adverse drug reactions, diagnosis change, or new procedure performed?: [x] No    [] Yes (see summary sheet for update)  Subjective functional status/changes:   [] No changes reported    States saw Dr. Mendel Caulk this week. \"He said I was fine. Said I need to work on my gait. It's really tight and sore today. I took my pain pill later than I usually do before therapy. \"    OBJECTIVE  Effusion:     Mid patella +1/2 cm   Supra patella =    Severe tenderness over longitudinal scar at graft site. Modality rationale: decrease edema, decrease inflammation and decrease pain to improve the patients ability to bend, walk, run.    Min Type Additional Details       [x] Estim: []Att   []Unatt    []TENS instruct                  []IFC  []Premod   []NMES                     [x]Bruneian 10/20 sec on/off  []w/US   []w/ice   []w/heat  Position:  Long sitting  Location:  Left quad       []  Traction: [] Cervical       []Lumbar                       [] Prone          []Supine                       []Intermittent   []Continuous Lbs:  [] before manual  [] after manual  []w/heat    []  Ultrasound: []Continuous   [] Pulsed                       at: []1MHz   []3MHz Location:  W/cm2:    [] Paraffin         Location:   []w/heat    []  Ice     []  Heat  []  Ice massage Position:  Location:    []  Laser  []  Other: Position:  Location:   20   [x]  Vasopneumatic Device Pressure:       [] lo [x] med [] hi   Temperature: 34 degrees [x] Skin assessment post-treatment:  [x]intact []redness- no adverse reaction    []redness  adverse reaction:     15 min Therapeutic Exercise:  [x] See flow sheet :   Rationale: increase ROM and increase strength to improve the patients ability to bend, walk, run. 40 min Manual Therapy:   Patella mobs  Kathleen scar mobs  Manual extension stretch  Seated flexion PROM (gravity assisted) repeated mid range to end range as tolerated    STM left quad  Supine rectus femoris stretch  Prone quad stretch with pre-stretch on wedge   Rationale: decrease pain, increase ROM and increase tissue extensibility to improve the patients ability to bend, walk, run.     min Gait Training:  ___ feet with ___ device on level surfaces with ___ level of assist   Rationale: increase ROM, increase strength, improve coordination and increase proprioception  to improve the patients ability to walk with normal gait pattern. With   [x] TE   [] TA   [] Neuro   [] SC   [] other: Patient Education: [x] Review HEP    [] Progressed/Changed HEP based on:   [] positioning   [] body mechanics   [] transfers   [] heat/ice application    [] other:      Other Objective/Functional Measures:      Pain Level (0-10 scale) post treatment: 4    ASSESSMENT/Changes in Function:  Increased knee pain and stiffness today. Tenderness over graft scar. Patient will continue to benefit from skilled PT services to modify and progress therapeutic interventions, address functional mobility deficits, address ROM deficits, address strength deficits, analyze and address soft tissue restrictions, analyze and cue movement patterns, analyze and modify body mechanics/ergonomics and assess and modify postural abnormalities to attain remaining goals. []  See Plan of Care  []  See progress note/recertification  []  See Discharge Summary         Progress towards goals / Updated goals:  Short Term Goals:   1.   AROM left knee 0 to 115 to improve functional bending. Improved, but not met. 2. PROM +1 to 120 degrees to improve. Improved, but not met. PLAN  [x]  Upgrade activities as tolerated     [x]  Continue plan of care  []  Update interventions per flow sheet       []  Discharge due to:_  [x]  Progress ROM, strength, and gait as tolerated.     Corinna Zuniga V, PT 11/4/2021

## 2021-11-08 ENCOUNTER — HOSPITAL ENCOUNTER (OUTPATIENT)
Dept: PHYSICAL THERAPY | Age: 46
Discharge: HOME | End: 2021-11-08
Payer: MEDICAID

## 2021-11-08 PROCEDURE — 97016 VASOPNEUMATIC DEVICE THERAPY: CPT | Performed by: PHYSICAL THERAPIST

## 2021-11-08 PROCEDURE — 97110 THERAPEUTIC EXERCISES: CPT | Performed by: PHYSICAL THERAPIST

## 2021-11-08 PROCEDURE — 97140 MANUAL THERAPY 1/> REGIONS: CPT | Performed by: PHYSICAL THERAPIST

## 2021-11-08 NOTE — PROGRESS NOTES
PT DAILY TREATMENT NOTE - [facility] 2-15    Patient Name: Sonny Benavides  Date:2021  : 1975  [x]  Patient  Verified  Payor: Ne Weber / Plan: [facility] [employer] [employer] / Product Type: Managed Care Medicaid /    In time:  1:15 pm  Out time: 2:35 pm  Total Treatment Time (min): 80  Total Timed Codes (min): 60  1:1 Treatment Time ( [address] only): 60  Visit #:  15    Treatment Area: Left knee pain [M25.562]    SUBJECTIVE  Pain Level (0-10 scale): 2  Any medication changes, allergies to medications, adverse drug reactions, diagnosis change, or new procedure performed?: [x] No    [] Yes (see summary sheet for update)  Subjective functional status/changes:   [] No changes reported    \"You're going to be proud of me. Look how much better I'm walking. \"  States stretched her knee and hiked with her dog this morning. OBJECTIVE  Effusion:     Mid patella +1/2 cm   Supra patella =    PROM 110 degrees seated flexion    Modality rationale: decrease edema, decrease inflammation and decrease pain to improve the patients ability to bend, walk, run.    Min Type Additional Details       [x] Estim: []Att   []Unatt    []TENS instruct                  []IFC  []Premod   []NMES                     [x]Swiss 10/20 sec on/off  []w/US   []w/ice   []w/heat  Position:  Long sitting  Location:  Left quad       []  Traction: [] Cervical       []Lumbar                       [] Prone          []Supine                       []Intermittent   []Continuous Lbs:  [] before manual  [] after manual  []w/heat    []  Ultrasound: []Continuous   [] Pulsed                       at: []1MHz   []3MHz Location:  W/cm2:    [] Paraffin         Location:   []w/heat    []  Ice     []  Heat  []  Ice massage Position:  Location:    []  Laser  []  Other: Position:  Location:   15   [x]  Vasopneumatic Device Pressure:       [] lo [x] med [] hi   Temperature: 34 degrees     [x] Skin assessment post-treatment:  [x]intact []redness- no adverse reaction    []redness  adverse reaction:     25 min Therapeutic Exercise:  [x] See flow sheet :   Rationale: increase ROM and increase strength to improve the patients ability to bend, walk, run. 35 min Manual Therapy:   Patella mobs  Kathleen scar mobs  Manual extension stretch  Seated flexion PROM (gravity assisted) repeated mid range to end range as tolerated    STM left quad  Supine rectus femoris stretch  Prone quad stretch with pre-stretch on wedge   Rationale: decrease pain, increase ROM and increase tissue extensibility to improve the patients ability to bend, walk, run.     min Gait Training:  ___ feet with ___ device on level surfaces with ___ level of assist   Rationale: increase ROM, increase strength, improve coordination and increase proprioception  to improve the patients ability to walk with normal gait pattern. With   [x] TE   [] TA   [] Neuro   [] SC   [] other: Patient Education: [x] Review HEP    [] Progressed/Changed HEP based on:   [] positioning   [] body mechanics   [] transfers   [] heat/ice application    [] other:      Other Objective/Functional Measures:      Pain Level (0-10 scale) post treatment: 4    ASSESSMENT/Changes in Function:  Increased flexion PROM today. Pain at end range of PROM. Patient will continue to benefit from skilled PT services to modify and progress therapeutic interventions, address functional mobility deficits, address ROM deficits, address strength deficits, analyze and address soft tissue restrictions, analyze and cue movement patterns, analyze and modify body mechanics/ergonomics and assess and modify postural abnormalities to attain remaining goals. []  See Plan of Care  []  See progress note/recertification  []  See Discharge Summary         Progress towards goals / Updated goals:  Short Term Goals:   1. AROM left knee 0 to 115 to improve functional bending. Improved, but not met. 2. PROM +1 to 120 degrees to improve.  Improved, but not met.       PLAN  [x]  Upgrade activities as tolerated     [x]  Continue plan of care  []  Update interventions per flow sheet       []  Discharge due to:_  [x]  Progress ROM, strength, and gait as tolerated.     Carla Milton V, PT 11/8/2021

## 2021-11-11 ENCOUNTER — HOSPITAL ENCOUNTER (OUTPATIENT)
Dept: PHYSICAL THERAPY | Age: 46
Discharge: HOME | End: 2021-11-11
Payer: MEDICAID

## 2021-11-11 PROCEDURE — 97016 VASOPNEUMATIC DEVICE THERAPY: CPT | Performed by: PHYSICAL THERAPIST

## 2021-11-11 PROCEDURE — 97110 THERAPEUTIC EXERCISES: CPT | Performed by: PHYSICAL THERAPIST

## 2021-11-11 PROCEDURE — 97140 MANUAL THERAPY 1/> REGIONS: CPT | Performed by: PHYSICAL THERAPIST

## 2021-11-11 NOTE — PROGRESS NOTES
PT DAILY TREATMENT NOTE - [facility] 2-15    Patient Name: Zara Bowers  Date:2021  : 1975  [x]  Patient  Verified  Payor: BLUE CROSS MEDICAID / Plan: [facility] [employer]KEEPERS PLUS / Product Type: Managed Care Medicaid /    In time:  12:15 pm  Out time: 1:35 pm  Total Treatment Time (min): 80  Total Timed Codes (min): 60  1:1 Treatment Time ([facility] only): 60  Visit #:  16    Treatment Area: Left knee pain [M25.562]    SUBJECTIVE  Pain Level (0-10 scale): 7  Any medication changes, allergies to medications, adverse drug reactions, diagnosis change, or new procedure performed?: [x] No    [] Yes (see summary sheet for update)  Subjective functional status/changes:   [] No changes reported  States increased knee pain and tightness this week. \"I haven't been able to do anything. You'll be omar to get it to 90 degrees. \"      OBJECTIVE  Effusion:     Mid patella +1/2 cm   Supra patella =    PROM 100 degrees seated flexion (103 degrees after [place])    Modality rationale: decrease edema, decrease inflammation and decrease pain to improve the patients ability to bend, walk, run.    Min Type Additional Details       [x] Estim: []Att   []Unatt    []TENS instruct                  []IFC  []Premod   []NMES                     [x]Nauruan 10/20 sec on/off  []w/US   []w/ice   []w/heat  Position:  Long sitting  Location:  Left quad       []  Traction: [] Cervical       []Lumbar                       [] Prone          []Supine                       []Intermittent   []Continuous Lbs:  [] before manual  [] after manual  []w/heat    []  Ultrasound: []Continuous   [] Pulsed                       at: []1MHz   []3MHz Location:  W/cm2:    [] Paraffin         Location:   []w/heat    []  Ice     []  Heat  []  Ice massage Position:  Location:    []  Laser  []  Other: Position:  Location:   15   [x]  Vasopneumatic Device Pressure:       [] lo [x] med [] hi   Temperature: 34 degrees     [x] Skin assessment post-treatment: [x]intact []redness- no adverse reaction    []redness  adverse reaction:     10 min Therapeutic Exercise:  [x] See flow sheet :   Rationale: increase ROM and increase strength to improve the patients ability to bend, walk, run. 50 min Manual Therapy:   Patella mobs  Kathleen scar mobs  Manual extension stretch  Seated flexion PROM (gravity assisted) repeated mid range to end range as tolerated    STM left quad  STM/TPR left gastroc muscle belly  Supine rectus femoris stretch  Prone quad stretch with pre-stretch on wedge (omit)   Rationale: decrease pain, increase ROM and increase tissue extensibility to improve the patients ability to bend, walk, run.     min Gait Training:  ___ feet with ___ device on level surfaces with ___ level of assist   Rationale: increase ROM, increase strength, improve coordination and increase proprioception  to improve the patients ability to walk with normal gait pattern. With   [x] TE   [] TA   [] Neuro   [] SC   [] other: Patient Education: [x] Review HEP    [] Progressed/Changed HEP based on:   [] positioning   [] body mechanics   [] transfers   [] heat/ice application    [] other:      Other Objective/Functional Measures:      Pain Level (0-10 scale) post treatment: 4     ASSESSMENT/Changes in Function:  ROM improves with manual therapy and repeated manual stretch but limited by pain at end range. Patient will continue to benefit from skilled PT services to modify and progress therapeutic interventions, address functional mobility deficits, address ROM deficits, address strength deficits, analyze and address soft tissue restrictions, analyze and cue movement patterns, analyze and modify body mechanics/ergonomics and assess and modify postural abnormalities to attain remaining goals. []  See Plan of Care  []  See progress note/recertification  []  See Discharge Summary         Progress towards goals / Updated goals:  Short Term Goals:   1.   AROM left knee 0 to 115 to improve functional bending. Improved, but not met. 2. PROM +1 to 120 degrees to improve. Improved, but not met. PLAN  [x]  Upgrade activities as tolerated     [x]  Continue plan of care  []  Update interventions per flow sheet       []  Discharge due to:_  [x]  Progress ROM, strength, and gait as tolerated.     Rod Davis V, PT 11/11/2021

## 2021-11-15 ENCOUNTER — HOSPITAL ENCOUNTER (OUTPATIENT)
Dept: PHYSICAL THERAPY | Age: 46
Discharge: HOME | End: 2021-11-15
Payer: MEDICAID

## 2021-11-15 PROCEDURE — 97110 THERAPEUTIC EXERCISES: CPT | Performed by: PHYSICAL THERAPIST

## 2021-11-15 PROCEDURE — 97016 VASOPNEUMATIC DEVICE THERAPY: CPT | Performed by: PHYSICAL THERAPIST

## 2021-11-15 PROCEDURE — 97140 MANUAL THERAPY 1/> REGIONS: CPT | Performed by: PHYSICAL THERAPIST

## 2021-11-15 NOTE — PROGRESS NOTES
PT DAILY TREATMENT NOTE - [facility] 2-15    Patient Name: Bk Slaughter  Date:11/15/2021  : 1975  [x]  Patient  Verified  Payor: BLUE CROSS MEDICAID / Plan: [facility] HEALTHKEEPERS PLUS / Product Type: Managed Care Medicaid /    In time:  1:15 pm  Out time: 2:35 pm  Total Treatment Time (min): 80  Total Timed Codes (min): 60  1:1 Treatment Time ( [address] only): 60  Visit #:  17    Treatment Area: Left knee pain [M25.562]    SUBJECTIVE  Pain Level (0-10 scale): 4  Any medication changes, allergies to medications, adverse drug reactions, diagnosis change, or new procedure performed?: [x] No    [] Yes (see summary sheet for update)  Subjective functional status/changes:   [] No changes reported  \"It's not as painful today. \"  States didn't work on her ROM as much this weekend due to increased pain. OBJECTIVE  Effusion:     Mid patella +1/2 cm   Supra patella =    PROM 110 degrees seated flexion     Modality rationale: decrease edema, decrease inflammation and decrease pain to improve the patients ability to bend, walk, run.    Min Type Additional Details       [x] Estim: []Att   []Unatt    []TENS instruct                  []IFC  []Premod   []NMES                     [x]Ukrainian 10/20 sec on/off  []w/US   []w/ice   []w/heat  Position:  Long sitting  Location:  Left quad       []  Traction: [] Cervical       []Lumbar                       [] Prone          []Supine                       []Intermittent   []Continuous Lbs:  [] before manual  [] after manual  []w/heat    []  Ultrasound: []Continuous   [] Pulsed                       at: []1MHz   []3MHz Location:  W/cm2:    [] Paraffin         Location:   []w/heat    []  Ice     []  Heat  []  Ice massage Position:  Location:    []  Laser  []  Other: Position:  Location:   15   [x]  Vasopneumatic Device Pressure:       [] lo [x] med [] hi   Temperature: 34 degrees     [x] Skin assessment post-treatment:  [x]intact []redness- no adverse reaction    []redness  adverse reaction:     10 min Therapeutic Exercise:  [x] See flow sheet :   Rationale: increase ROM and increase strength to improve the patients ability to bend, walk, run. 50 min Manual Therapy:   Patella mobs  Kathleen scar mobs  Manual extension stretch  Seated flexion PROM (gravity assisted) repeated mid range to end range as tolerated    STM left quad and with roller  Supine rectus femoris stretch  Prone quad stretch with pre-stretch on wedge (omit)   Rationale: decrease pain, increase ROM and increase tissue extensibility to improve the patients ability to bend, walk, run.     min Gait Training:  ___ feet with ___ device on level surfaces with ___ level of assist   Rationale: increase ROM, increase strength, improve coordination and increase proprioception  to improve the patients ability to walk with normal gait pattern. With   [x] TE   [] TA   [] Neuro   [] SC   [] other: Patient Education: [x] Review HEP    [] Progressed/Changed HEP based on:   [] positioning   [] body mechanics   [] transfers   [] heat/ice application    [] other:      Other Objective/Functional Measures:      Pain Level (0-10 scale) post treatment: 4     ASSESSMENT/Changes in Function:  Increased flexion PROM since last visit. Patient frustrated by lack of knee ROM and stiffness. Patient will continue to benefit from skilled PT services to modify and progress therapeutic interventions, address functional mobility deficits, address ROM deficits, address strength deficits, analyze and address soft tissue restrictions, analyze and cue movement patterns, analyze and modify body mechanics/ergonomics and assess and modify postural abnormalities to attain remaining goals. []  See Plan of Care  []  See progress note/recertification  []  See Discharge Summary         Progress towards goals / Updated goals:  Short Term Goals:   1. AROM left knee 0 to 115 to improve functional bending. Improved, but not met. 2.   PROM +1 to 120 degrees to improve. Improved, but not met. PLAN  [x]  Upgrade activities as tolerated     [x]  Continue plan of care  []  Update interventions per flow sheet       []  Discharge due to:_  [x]  Progress ROM, strength, and gait as tolerated.     Junior Rodas V, PT 11/15/2021

## 2021-11-18 ENCOUNTER — HOSPITAL ENCOUNTER (OUTPATIENT)
Dept: PHYSICAL THERAPY | Age: 46
Discharge: HOME | End: 2021-11-18
Payer: MEDICAID

## 2021-11-18 PROCEDURE — 97140 MANUAL THERAPY 1/> REGIONS: CPT | Performed by: PHYSICAL THERAPIST

## 2021-11-18 PROCEDURE — 97110 THERAPEUTIC EXERCISES: CPT | Performed by: PHYSICAL THERAPIST

## 2021-11-19 NOTE — PROGRESS NOTES
PT DAILY TREATMENT NOTE - [facility] 2-15    Patient Name: Monie Hawkins  Date:2021  : 1975  [x]  Patient  Verified  Payor: BLUE CROSS MEDICAID / Plan: [facility] / Product Type: Managed Care Medicaid /    In time:  12:15 pm  Out time: 1:45 pm  Total Treatment Time (min): 90  Total Timed Codes (min): 70  1:1 Treatment Time ( [address] only): 70  Visit #:  18    Treatment Area: Left knee pain [M25.562]    SUBJECTIVE  Pain Level (0-10 scale): 2  Any medication changes, allergies to medications, adverse drug reactions, diagnosis change, or new procedure performed?: [x] No    [] Yes (see summary sheet for update)  Subjective functional status/changes:   [] No changes reported  \"I can bend it more if I turn my foot inward. \"      OBJECTIVE  PROM 115 degrees seated flexion     Modality rationale: decrease edema, decrease inflammation and decrease pain to improve the patients ability to bend, walk, run.    Min Type Additional Details       [x] Estim: []Att   []Unatt    []TENS instruct                  []IFC  []Premod   []NMES                     [x]Citizen of [place] 10/20 sec on/off  []w/US   []w/ice   []w/heat  Position:  Long sitting  Location:  Left quad       []  Traction: [] Cervical       []Lumbar                       [] Prone          []Supine                       []Intermittent   []Continuous Lbs:  [] before manual  [] after manual  []w/heat    []  Ultrasound: []Continuous   [] Pulsed                       at: []1MHz   []3MHz Location:  W/cm2:    [] Paraffin         Location:   []w/heat    []  Ice     []  Heat  []  Ice massage Position:  Location:    []  Laser  []  Other: Position:  Location:   Declined   [x]  Vasopneumatic Device Pressure:       [] lo [x] med [] hi   Temperature: 34 degrees     [x] Skin assessment post-treatment:  [x]intact []redness- no adverse reaction    []redness  adverse reaction:     25 min Therapeutic Exercise:  [x] See flow sheet :   Rationale: increase ROM and increase strength to improve the patients ability to bend, walk, run. 45 min Manual Therapy:   Patella mobs  Kathleen scar and graft site mobs  Manual extension stretch  Seated flexion PROM (gravity assisted) repeated mid range to end range as tolerated    STM left quad   Supine rectus femoris stretch  Gentle posterior glides in sitting  Prone quad stretch with pre-stretch on wedge (omit)   Rationale: decrease pain, increase ROM and increase tissue extensibility to improve the patients ability to bend, walk, run.     min Gait Training:  ___ feet with ___ device on level surfaces with ___ level of assist   Rationale: increase ROM, increase strength, improve coordination and increase proprioception  to improve the patients ability to walk with normal gait pattern. With   [x] TE   [] TA   [] Neuro   [] SC   [] other: Patient Education: [x] Review HEP    [] Progressed/Changed HEP based on:   [] positioning   [] body mechanics   [] transfers   [] heat/ice application    [] other:      Other Objective/Functional Measures:      Pain Level (0-10 scale) post treatment: 1    ASSESSMENT/Changes in Function:  Increased flexion PROM today. Gait improved. Patient will continue to benefit from skilled PT services to modify and progress therapeutic interventions, address functional mobility deficits, address ROM deficits, address strength deficits, analyze and address soft tissue restrictions, analyze and cue movement patterns, analyze and modify body mechanics/ergonomics and assess and modify postural abnormalities to attain remaining goals. []  See Plan of Care  []  See progress note/recertification  []  See Discharge Summary         Progress towards goals / Updated goals:  Short Term Goals:   1. AROM left knee 0 to 115 to improve functional bending. Improved, but not met. 2. PROM +1 to 120 degrees to improve. Improved, but not met.        PLAN  [x]  Upgrade activities as tolerated     [x]  Continue plan of care  [] Update interventions per flow sheet       []  Discharge due to:_  [x]  Progress ROM, strength, and gait as tolerated.     Mary Ryder V, PT 11/18/2021

## 2021-11-22 ENCOUNTER — HOSPITAL ENCOUNTER (OUTPATIENT)
Dept: PHYSICAL THERAPY | Age: 46
Discharge: HOME | End: 2021-11-22
Payer: MEDICAID

## 2021-11-22 PROCEDURE — 97110 THERAPEUTIC EXERCISES: CPT | Performed by: PHYSICAL THERAPIST

## 2021-11-22 PROCEDURE — 97140 MANUAL THERAPY 1/> REGIONS: CPT | Performed by: PHYSICAL THERAPIST

## 2021-11-22 NOTE — PROGRESS NOTES
PT DAILY TREATMENT NOTE - [facility] 2-15    Patient Name: Kenton Gaitan  Date:2021  : 1975  [x]  Patient  Verified  Payor: BLUE CROSS MEDICAID / Plan: [facility] / Product Type: Managed Care Medicaid /    In time:  1:20 pm  Out time: 2:40 pm  Total Treatment Time (min): 80  Total Timed Codes (min): 70  1:1 Treatment Time ( [address] only): 70  Visit #:  19    Treatment Area: Left knee pain [M25.562]    SUBJECTIVE  Pain Level (0-10 scale): 2  Any medication changes, allergies to medications, adverse drug reactions, diagnosis change, or new procedure performed?: [x] No    [] Yes (see summary sheet for update)  Subjective functional status/changes:   [] No changes reported  \"I couldn't do anything this weekend. It's really stiff. \"      OBJECTIVE  PROM 117 degrees seated flexion     Modality rationale: decrease edema, decrease inflammation and decrease pain to improve the patients ability to bend, walk, run.    Min Type Additional Details       [x] Estim: []Att   []Unatt    []TENS instruct                  []IFC  []Premod   []NMES                     [x]Macedonian 10/20 sec on/off  []w/US   []w/ice   []w/heat  Position:  Long sitting  Location:  Left quad       []  Traction: [] Cervical       []Lumbar                       [] Prone          []Supine                       []Intermittent   []Continuous Lbs:  [] before manual  [] after manual  []w/heat    []  Ultrasound: []Continuous   [] Pulsed                       at: []1MHz   []3MHz Location:  W/cm2:    [] Paraffin         Location:   []w/heat    []  Ice     []  Heat  []  Ice massage Position:  Location:    []  Laser  []  Other: Position:  Location:   Declined   [x]  Vasopneumatic Device Pressure:       [] lo [x] med [] hi   Temperature: 34 degrees     [x] Skin assessment post-treatment:  [x]intact []redness- no adverse reaction    []redness  adverse reaction:     25 min Therapeutic Exercise:  [x] See flow sheet :   Rationale: increase ROM and increase strength to improve the patients ability to bend, walk, run. 45 min Manual Therapy:   Patella mobs  Kathleen scar and graft site mobs  Manual extension stretch  Seated flexion PROM (gravity assisted) repeated mid range to end range as tolerated    STM left quad   Supine rectus femoris stretch  Gentle posterior glides in sitting  Prone quad stretch with pre-stretch on wedge (omit)   Rationale: decrease pain, increase ROM and increase tissue extensibility to improve the patients ability to bend, walk, run.     min Gait Training:  ___ feet with ___ device on level surfaces with ___ level of assist   Rationale: increase ROM, increase strength, improve coordination and increase proprioception  to improve the patients ability to walk with normal gait pattern. With   [x] TE   [] TA   [] Neuro   [] SC   [] other: Patient Education: [x] Review HEP    [] Progressed/Changed HEP based on:   [] positioning   [] body mechanics   [] transfers   [] heat/ice application    [] other:      Other Objective/Functional Measures:      Pain Level (0-10 scale) post treatment: 1    ASSESSMENT/Changes in Function:  Slightly increased flexion PROM. Patient will continue to benefit from skilled PT services to modify and progress therapeutic interventions, address functional mobility deficits, address ROM deficits, address strength deficits, analyze and address soft tissue restrictions, analyze and cue movement patterns, analyze and modify body mechanics/ergonomics and assess and modify postural abnormalities to attain remaining goals. []  See Plan of Care  []  See progress note/recertification  []  See Discharge Summary         Progress towards goals / Updated goals:  Short Term Goals:   1. AROM left knee 0 to 115 to improve functional bending. Improved, but not met. 2. PROM +1 to 120 degrees to improve. Improved, but not met.        PLAN  [x]  Upgrade activities as tolerated     [x]  Continue plan of care  [] Update interventions per flow sheet       []  Discharge due to:_  [x]  Progress ROM, strength, and gait as tolerated.     Alesha Aceves V, PT 11/22/2021

## 2021-11-24 ENCOUNTER — HOSPITAL ENCOUNTER (OUTPATIENT)
Dept: PHYSICAL THERAPY | Age: 46
Discharge: HOME | End: 2021-11-24
Payer: MEDICAID

## 2021-11-24 PROCEDURE — 97016 VASOPNEUMATIC DEVICE THERAPY: CPT | Performed by: PHYSICAL THERAPY ASSISTANT

## 2021-11-24 PROCEDURE — 97110 THERAPEUTIC EXERCISES: CPT | Performed by: PHYSICAL THERAPY ASSISTANT

## 2021-11-24 PROCEDURE — 97140 MANUAL THERAPY 1/> REGIONS: CPT | Performed by: PHYSICAL THERAPY ASSISTANT

## 2021-11-24 NOTE — PROGRESS NOTES
PT DAILY TREATMENT NOTE - [facility] 2-15    Patient Name: Glenwood Hashimoto  Date:2021  : 1975  [x]  Patient  Verified  Payor: BLUE CROSS MEDICAID / Plan: [facility] [place] / Product Type: Managed Care Medicaid /    In time:  10:10 am  Out time: 11:40 pm  Total Treatment Time (min): 90  Total Timed Codes (min): 75  1:1 Treatment Time ( [address] only): 75  Visit #:  20    Treatment Area: Left knee pain [M25.562]    SUBJECTIVE  Pain Level (0-10 scale): 2  Any medication changes, allergies to medications, adverse drug reactions, diagnosis change, or new procedure performed?: [x] No    [] Yes (see summary sheet for update)  Subjective functional status/changes:   [] No changes reported  Pt reports she had a pop in her knee last session while stretching and she's felt a pop on the outside of her knee while walking. Pt reports no pain w/ this but is concerned. OBJECTIVE  PROM 118 degrees seated flexion     Modality rationale: decrease edema, decrease inflammation and decrease pain to improve the patients ability to bend, walk, run.    Min Type Additional Details       [x] Estim: []Att   []Unatt    []TENS instruct                  []IFC  []Premod   []NMES                     [x]Gabonese 10/20 sec on/off  []w/US   []w/ice   []w/heat  Position:  Long sitting  Location:  Left quad       []  Traction: [] Cervical       []Lumbar                       [] Prone          []Supine                       []Intermittent   []Continuous Lbs:  [] before manual  [] after manual  []w/heat    []  Ultrasound: []Continuous   [] Pulsed                       at: []1MHz   []3MHz Location:  W/cm2:    [] Paraffin         Location:   []w/heat    []  Ice     []  Heat  []  Ice massage Position:  Location:    []  Laser  []  Other: Position:  Location:   15   [x]  Vasopneumatic Device Pressure:       [] lo [x] med [] hi   Temperature: 34 degrees     [x] Skin assessment post-treatment:  [x]intact []redness- no adverse reaction []redness  adverse reaction:     25 min Therapeutic Exercise:  [x] See flow sheet :   Rationale: increase ROM and increase strength to improve the patients ability to bend, walk, run. 45 min Manual Therapy:   Patella mobs  Kathleen scar and graft site mobs  Manual extension stretch  Seated flexion PROM (gravity assisted) repeated mid range to end range as tolerated    STM left quad   Supine rectus femoris stretch  Gentle posterior glides in sitting  Prone quad stretch with pre-stretch on wedge (omit)   Rationale: decrease pain, increase ROM and increase tissue extensibility to improve the patients ability to bend, walk, run.     min Gait Training:  ___ feet with ___ device on level surfaces with ___ level of assist   Rationale: increase ROM, increase strength, improve coordination and increase proprioception  to improve the patients ability to walk with normal gait pattern. With   [x] TE   [] TA   [] Neuro   [] SC   [] other: Patient Education: [x] Review HEP    [] Progressed/Changed HEP based on:   [] positioning   [] body mechanics   [] transfers   [] heat/ice application    [] other:      Other Objective/Functional Measures: 0 deg of knee extension w/ overpressure, 119 deg of knee flexion w/ overpressure in supine     Pain Level (0-10 scale) post treatment: 1    ASSESSMENT/Changes in Function:  Pt making slow gains and educated on how to stretch at home. Pt demonstrates slightly improved knee flexion ROM. Pt educated on popping sensation and gait mechanics. Patient will continue to benefit from skilled PT services to modify and progress therapeutic interventions, address functional mobility deficits, address ROM deficits, address strength deficits, analyze and address soft tissue restrictions, analyze and cue movement patterns, analyze and modify body mechanics/ergonomics and assess and modify postural abnormalities to attain remaining goals.      []  See Plan of Care  []  See progress note/recertification  []  See Discharge Summary         Progress towards goals / Updated goals:  Short Term Goals:   1. AROM left knee 0 to 115 to improve functional bending. Improved, but not met. 2. PROM +1 to 120 degrees to improve. Improved, but not met. PLAN  [x]  Upgrade activities as tolerated     [x]  Continue plan of care  []  Update interventions per flow sheet       []  Discharge due to:_  [x]  Progress ROM, strength, and gait as tolerated.     Efren Armando, PT 11/24/2021

## 2021-11-29 ENCOUNTER — HOSPITAL ENCOUNTER (OUTPATIENT)
Dept: PHYSICAL THERAPY | Age: 46
Discharge: HOME | End: 2021-11-29
Payer: MEDICAID

## 2021-11-29 PROCEDURE — 97110 THERAPEUTIC EXERCISES: CPT | Performed by: PHYSICAL THERAPIST

## 2021-11-29 PROCEDURE — 97140 MANUAL THERAPY 1/> REGIONS: CPT | Performed by: PHYSICAL THERAPIST

## 2021-11-29 NOTE — PROGRESS NOTES
PT DAILY TREATMENT NOTE - [facility] 2-15    Patient Name: Liisa Peabody  Date:2021  : 1975  [x]  Patient  Verified  Payor: Rosy Gill / Plan: [address] [place] [address] / Product Type: Managed Care Medicaid /    In time:  1:15 pm  Out time: 2:25 pm  Total Treatment Time (min): 80  Total Timed Codes (min): 75  1:1 Treatment Time ( [address] only): 75  Visit #:  21    Treatment Area: Left knee pain [M25.562]    SUBJECTIVE  Pain Level (0-10 scale): 2  Any medication changes, allergies to medications, adverse drug reactions, diagnosis change, or new procedure performed?: [x] No    [] Yes (see summary sheet for update)  Subjective functional status/changes:     States walking better but knee still feels stiff. Complains of medial and lateral knee pain and stiffness when tries to bend it. OBJECTIVE  Effusion:   Mid patella +1/2 cm   Supra patella -1/2 cm    AROM:  110 degrees seated flexion    PROM:  120 degrees seated flexion (after repeated stretching)    Gait:  Increased knee flexion in swing, but still limited. Decreased extension at heel strike, but assessed after working on flexion PROM. Modality rationale: decrease edema, decrease inflammation and decrease pain to improve the patients ability to bend, walk, run.    Min Type Additional Details       [x] Estim: []Att   []Unatt    []TENS instruct                  []IFC  []Premod   []NMES                     [x]Mauritanian 10/20 sec on/off  []w/US   []w/ice   []w/heat  Position:  Long sitting  Location:  Left quad       []  Traction: [] Cervical       []Lumbar                       [] Prone          []Supine                       []Intermittent   []Continuous Lbs:  [] before manual  [] after manual  []w/heat    []  Ultrasound: []Continuous   [] Pulsed                       at: []1MHz   []3MHz Location:  W/cm2:    [] Paraffin         Location:   []w/heat    []  Ice     []  Heat  []  Ice massage Position:  Location:    []  Laser  []  Other: Position:  Location:      [x]  Vasopneumatic Device Pressure:       [] lo [x] med [] hi   Temperature: 34 degrees     [x] Skin assessment post-treatment:  [x]intact []redness- no adverse reaction    []redness  adverse reaction:     30 min Therapeutic Exercise:  [x] See flow sheet :   Rationale: increase ROM and increase strength to improve the patients ability to bend, walk, run. 45 min Manual Therapy:   Patella mobs  Kathleen scar and graft site mobs  Manual extension stretch  Seated flexion PROM (gravity assisted) repeated mid range to end range as tolerated    STM left quad   Supine rectus femoris stretch  Gentle posterior glides in sitting  Prone quad stretch with pre-stretch on wedge (omit)   Rationale: decrease pain, increase ROM and increase tissue extensibility to improve the patients ability to bend, walk, run.     min Gait Training:  ___ feet with ___ device on level surfaces with ___ level of assist   Rationale: increase ROM, increase strength, improve coordination and increase proprioception  to improve the patients ability to walk with normal gait pattern. With   [x] TE   [] TA   [] Neuro   [] SC   [] other: Patient Education: [x] Review HEP    [] Progressed/Changed HEP based on:   [] positioning   [] body mechanics   [] transfers   [] heat/ice application    [] other:      Other Objective/Functional Measures:      Pain Level (0-10 scale) post treatment: 1    ASSESSMENT/Changes in Function:  Gait improving. Slightly improved ROM, but stiffness continues. .  Patient will continue to benefit from skilled PT services to modify and progress therapeutic interventions, address functional mobility deficits, address ROM deficits, address strength deficits, analyze and address soft tissue restrictions, analyze and cue movement patterns, analyze and modify body mechanics/ergonomics and assess and modify postural abnormalities to attain remaining goals.      []  See Plan of Care  []  See progress note/recertification  []  See Discharge Summary         Progress towards goals / Updated goals:  Short Term Goals:   1. AROM left knee 0 to 115 to improve functional bending. Improved, but not met. 2. PROM +1 to 120 degrees to improve. Met      PLAN  [x]  Upgrade activities as tolerated     [x]  Continue plan of care  []  Update interventions per flow sheet       []  Discharge due to:_  [x]  Progress ROM, strength, and gait as tolerated.     Avinash Munguia V, PT  11/29/2021

## 2021-11-30 NOTE — PROGRESS NOTES
MD NOTE - [facility] 2-15    Patient Name: Kath Clark  Date:2021  : 1975  [x]  Patient  Verified  Payor: BLUE CROSS MEDICAID / Plan: [facility] / Product Type: Managed Care Medicaid /      Treatment Area: Left knee pain [M25.562]    SUBJECTIVE  Pain Level (0-10 scale): 2    Subjective functional status/changes:     States walking better but knee still feels stiff. Complains of medial and lateral knee pain and stiffness when tries to bend it. OBJECTIVE  Effusion:   Mid patella +1/2 cm   Supra patella -1/2 cm    AROM:  110 degrees seated flexion    PROM:  120 degrees seated flexion (after repeated stretching)    Gait:  Increased knee flexion in swing, but still limited. Decreased extension at heel strike, but assessed after working on flexion PROM. Pain Level (0-10 scale) post treatment: 1    ASSESSMENT/Changes in Function:  Gait improving. Slightly improved ROM, but stiffness continues. Progress towards goals / Updated goals:  Short Term Goals:   1. AROM left knee 0 to 115 to improve functional bending. Improved, but not met. 2. PROM +1 to 120 degrees to improve. Met      PLAN  [x]  Upgrade activities as tolerated     [x]  Continue plan of care  []  Update interventions per flow sheet       [x]  Follow up with Dr. Brandee Emanuel this week. [x]  Progress ROM, strength, and gait as tolerated.     Lew Blevins V, PT  2021

## 2021-12-02 ENCOUNTER — HOSPITAL ENCOUNTER (OUTPATIENT)
Dept: PHYSICAL THERAPY | Age: 46
Discharge: HOME | End: 2021-12-02
Payer: MEDICAID

## 2021-12-02 PROCEDURE — 97110 THERAPEUTIC EXERCISES: CPT | Performed by: PHYSICAL THERAPIST

## 2021-12-02 PROCEDURE — 97140 MANUAL THERAPY 1/> REGIONS: CPT | Performed by: PHYSICAL THERAPIST

## 2021-12-03 NOTE — PROGRESS NOTES
PT DAILY TREATMENT NOTE - [facility] 2-15    Patient Name: Mandy Silveira  Date:2021  : 1975  [x]  Patient  Verified  Payor: Serene Martell / Plan: [address] [place] [address] / Product Type: Managed Care Medicaid /    In time:  12:20 pm  Out time: 1:40 pm  Total Treatment Time (min): 80  Total Timed Codes (min): 75  1:1 Treatment Time ( [address] only): 75  Visit #:  22    Treatment Area: Left knee pain [M25.562]    SUBJECTIVE  Pain Level (0-10 scale): 2  Any medication changes, allergies to medications, adverse drug reactions, diagnosis change, or new procedure performed?: [x] No    [] Yes (see summary sheet for update)  Subjective functional status/changes:     States Dr. Acey Dandy pleased with progress. Putting her on Meloxicam for her stiffness. OBJECTIVE  Effusion:   Mid patella =   Supra patella =    PROM:  121 degrees seated flexion (after repeated stretching)    Modality rationale: decrease edema, decrease inflammation and decrease pain to improve the patients ability to bend, walk, run.    Min Type Additional Details       [x] Estim: []Att   []Unatt    []TENS instruct                  []IFC  []Premod   []NMES                     [x]Lao 10/20 sec on/off  []w/US   []w/ice   []w/heat  Position:  Long sitting  Location:  Left quad       []  Traction: [] Cervical       []Lumbar                       [] Prone          []Supine                       []Intermittent   []Continuous Lbs:  [] before manual  [] after manual  []w/heat    []  Ultrasound: []Continuous   [] Pulsed                       at: []1MHz   []3MHz Location:  W/cm2:    [] Paraffin         Location:   []w/heat    []  Ice     []  Heat  []  Ice massage Position:  Location:    []  Laser  []  Other: Position:  Location:      [x]  Vasopneumatic Device Pressure:       [] lo [x] med [] hi   Temperature: 34 degrees     [x] Skin assessment post-treatment:  [x]intact []redness- no adverse reaction    []redness  adverse reaction:     30 min Therapeutic Exercise:  [x] See flow sheet :   Rationale: increase ROM and increase strength to improve the patients ability to bend, walk, run. 45 min Manual Therapy:   Patella mobs  Kathleen scar and graft site mobs  Manual extension stretch  Seated flexion PROM (gravity assisted)    STM left quad   Supine rectus femoris stretch  Gentle posterior glides in sitting  Prone quad stretch with pre-stretch on wedge (omit)   Rationale: decrease pain, increase ROM and increase tissue extensibility to improve the patients ability to bend, walk, run.     min Gait Training:  ___ feet with ___ device on level surfaces with ___ level of assist   Rationale: increase ROM, increase strength, improve coordination and increase proprioception  to improve the patients ability to walk with normal gait pattern. With   [x] TE   [] TA   [] Neuro   [] SC   [] other: Patient Education: [x] Review HEP    [] Progressed/Changed HEP based on:   [] positioning   [] body mechanics   [] transfers   [] heat/ice application    [] other:      Other Objective/Functional Measures:      Pain Level (0-10 scale) post treatment: 1    ASSESSMENT/Changes in Function:  Pain at end range limits ROM. Patient will continue to benefit from skilled PT services to modify and progress therapeutic interventions, address functional mobility deficits, address ROM deficits, address strength deficits, analyze and address soft tissue restrictions, analyze and cue movement patterns, analyze and modify body mechanics/ergonomics and assess and modify postural abnormalities to attain remaining goals. []  See Plan of Care  []  See progress note/recertification  []  See Discharge Summary         Progress towards goals / Updated goals:  Short Term Goals:   1. AROM left knee 0 to 115 to improve functional bending. Improved, but not met. 2. PROM +1 to 120 degrees to improve.  Met      PLAN  [x]  Upgrade activities as tolerated     [x]  Continue plan of care  []  Update interventions per flow sheet       []  Discharge due to:_  [x]  Progress ROM, strength, and gait as tolerated.     Rachid Pena V, PT  12/2/2021

## 2021-12-06 ENCOUNTER — HOSPITAL ENCOUNTER (OUTPATIENT)
Dept: PHYSICAL THERAPY | Age: 46
Discharge: HOME | End: 2021-12-06
Payer: MEDICAID

## 2021-12-06 PROCEDURE — 97110 THERAPEUTIC EXERCISES: CPT | Performed by: PHYSICAL THERAPIST

## 2021-12-06 PROCEDURE — 97140 MANUAL THERAPY 1/> REGIONS: CPT | Performed by: PHYSICAL THERAPIST

## 2021-12-06 PROCEDURE — 97016 VASOPNEUMATIC DEVICE THERAPY: CPT | Performed by: PHYSICAL THERAPIST

## 2021-12-06 NOTE — PROGRESS NOTES
PT DAILY TREATMENT NOTE - [facility] 2-15    Patient Name: Darvin Conroy  Date:2021  : 1975  [x]  Patient  Verified  Payor: BL[employer]ICAID / Plan: [facility] [employer] / Product Type: Managed Care Medicaid /    In time:  1:15 pm  Out time: 2:20 pm  Total Treatment Time (min): 65  Total Timed Codes (min): 45  1:1 Treatment Time ( [address] only): 39  Visit #:  23    Treatment Area: Left knee pain [M25.562]    SUBJECTIVE  Pain Level (0-10 scale): 5  Any medication changes, allergies to medications, adverse drug reactions, diagnosis change, or new procedure performed?: [x] No    [] Yes (see summary sheet for update)  Subjective functional status/changes:     \"I worked 7 hours last night. It is so stiff and sore. \"    OBJECTIVE  Effusion:   Mid patella =   Supra patella -1/2 cm   Infra patella +1/2 cm    PROM:  112 degrees seated flexion (after repeated stretching)    Modality rationale: decrease edema, decrease inflammation and decrease pain to improve the patients ability to bend, walk, run.    Min Type Additional Details       [x] Estim: []Att   []Unatt    []TENS instruct                  []IFC  []Premod   []NMES                     [x]Uruguayan 10/20 sec on/off  []w/US   []w/ice   []w/heat  Position:  Long sitting  Location:  Left quad       []  Traction: [] Cervical       []Lumbar                       [] Prone          []Supine                       []Intermittent   []Continuous Lbs:  [] before manual  [] after manual  []w/heat    []  Ultrasound: []Continuous   [] Pulsed                       at: []1MHz   []3MHz Location:  W/cm2:    [] Paraffin         Location:   []w/heat    []  Ice     []  Heat  []  Ice massage Position:  Location:    []  Laser  []  Other: Position:  Location:   15   [x]  Vasopneumatic Device Pressure:       [] lo [] med [x] hi   Temperature: 34 degrees     [x] Skin assessment post-treatment:  [x]intact []redness- no adverse reaction    []redness  adverse reaction:     15 min Therapeutic Exercise:  [x] See flow sheet :   Rationale: increase ROM and increase strength to improve the patients ability to bend, walk, run. 30 min Manual Therapy:   Patella mobs  Kathleen scar and graft site mobs  Manual extension stretch  Seated flexion PROM (gravity assisted)    IASTM left distal quad with the claw  Supine rectus femoris stretch  Gentle posterior glides in sitting  Prone quad stretch with pre-stretch on wedge (omit)   Rationale: decrease pain, increase ROM and increase tissue extensibility to improve the patients ability to bend, walk, run.     min Gait Training:  ___ feet with ___ device on level surfaces with ___ level of assist   Rationale: increase ROM, increase strength, improve coordination and increase proprioception  to improve the patients ability to walk with normal gait pattern. With   [x] TE   [] TA   [] Neuro   [] SC   [] other: Patient Education: [x] Review HEP    [] Progressed/Changed HEP based on:   [] positioning   [] body mechanics   [] transfers   [] heat/ice application    [] other:      Other Objective/Functional Measures:      Pain Level (0-10 scale) post treatment: 3    ASSESSMENT/Changes in Function:  Local swelling at distal graft site noted. Patient will continue to benefit from skilled PT services to modify and progress therapeutic interventions, address functional mobility deficits, address ROM deficits, address strength deficits, analyze and address soft tissue restrictions, analyze and cue movement patterns, analyze and modify body mechanics/ergonomics and assess and modify postural abnormalities to attain remaining goals. []  See Plan of Care  []  See progress note/recertification  []  See Discharge Summary         Progress towards goals / Updated goals:  Short Term Goals:   1. AROM left knee 0 to 115 to improve functional bending. Improved, but not met. 2. PROM +1 to 120 degrees to improve.  Met      PLAN  [x]  Upgrade activities as tolerated     [x]  Continue plan of care  []  Update interventions per flow sheet       []  Discharge due to:_  [x]  Progress ROM, strength, and gait as tolerated.     Israel Monroy V, PT  12/6/2021

## 2021-12-09 ENCOUNTER — HOSPITAL ENCOUNTER (OUTPATIENT)
Dept: PHYSICAL THERAPY | Age: 46
Discharge: HOME | End: 2021-12-09
Payer: MEDICAID

## 2021-12-09 PROCEDURE — 97110 THERAPEUTIC EXERCISES: CPT | Performed by: PHYSICAL THERAPIST

## 2021-12-09 PROCEDURE — 97140 MANUAL THERAPY 1/> REGIONS: CPT | Performed by: PHYSICAL THERAPIST

## 2021-12-09 NOTE — PROGRESS NOTES
PT DAILY TREATMENT NOTE - [facility] 2-15    Patient Name: Quique Terrazas  Date:2021  : 1975  [x]  Patient  Verified  Payor: BLUE CROSS MEDICAID / Plan: [facility] [employer] / Product Type: Managed Care Medicaid /    In time:  12:15 pm  Out time: 1:40 pm  Total Treatment Time (min): 85  Total Timed Codes (min): 75  1:1 Treatment Time ( [address] only): 75  Visit #:  24    Treatment Area: Left knee pain [M25.562]    SUBJECTIVE  Pain Level (0-10 scale): 4  Any medication changes, allergies to medications, adverse drug reactions, diagnosis change, or new procedure performed?: [x] No    [] Yes (see summary sheet for update)  Subjective functional status/changes:     \"It's not as bad, but I'm not sleeping. 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 \"    OBJECTIVE  Effusion:   Mid patella =   Supra patella -1/2 cm   Infra patella -1/2 cm    PROM:  117 degrees seated flexion (after repeated stretching)    Modality rationale: decrease edema, decrease inflammation and decrease pain to improve the patients ability to bend, walk, run.    Min Type Additional Details       [x] Estim: []Att   []Unatt    []TENS instruct                  []IFC  []Premod   []NMES                     [x]Congolese 10/20 sec on/off  []w/US   []w/ice   []w/heat  Position:  Long sitting  Location:  Left quad       []  Traction: [] Cervical       []Lumbar                       [] Prone          []Supine                       []Intermittent   []Continuous Lbs:  [] before manual  [] after manual  []w/heat    []  Ultrasound: []Continuous   [] Pulsed                       at: []1MHz   []3MHz Location:  W/cm2:    [] Paraffin         Location:   []w/heat    []  Ice     []  Heat  []  Ice massage Position:  Location:    []  Laser  []  Other: Position:  Location:      [x]  Vasopneumatic Device Pressure:       [] lo [] med [x] hi   Temperature: 34 degrees     [x] Skin assessment post-treatment:  [x]intact []redness- no adverse reaction    []redness  adverse reaction:     30 min Therapeutic Exercise:  [x] See flow sheet :   Rationale: increase ROM and increase strength to improve the patients ability to bend, walk, run. 45 min Manual Therapy:   Patella mobs  Kathleen scar and graft site mobs  Manual extension stretch  Seated flexion PROM  STM left distal quad while in stretch position  Supine rectus femoris stretch  Gentle posterior glides in sitting  Prone quad stretch with pre-stretch on wedge (omit)   Rationale: decrease pain, increase ROM and increase tissue extensibility to improve the patients ability to bend, walk, run.     min Gait Training:  ___ feet with ___ device on level surfaces with ___ level of assist   Rationale: increase ROM, increase strength, improve coordination and increase proprioception  to improve the patients ability to walk with normal gait pattern. With   [x] TE   [] TA   [] Neuro   [] SC   [] other: Patient Education: [x] Review HEP    [] Progressed/Changed HEP based on:   [] positioning   [] body mechanics   [] transfers   [] heat/ice application    [] other:      Other Objective/Functional Measures:      Pain Level (0-10 scale) post treatment: 3    ASSESSMENT/Changes in Function:  Patient frustrated with lack of ROM progress. Patient will continue to benefit from skilled PT services to modify and progress therapeutic interventions, address functional mobility deficits, address ROM deficits, address strength deficits, analyze and address soft tissue restrictions, analyze and cue movement patterns, analyze and modify body mechanics/ergonomics and assess and modify postural abnormalities to attain remaining goals. []  See Plan of Care  []  See progress note/recertification  []  See Discharge Summary         Progress towards goals / Updated goals:  Short Term Goals:   1. AROM left knee 0 to 115 to improve functional bending. Improved, but not met. 2. PROM +1 to 120 degrees to improve.  Met      PLAN  [x]  Upgrade activities as tolerated     [x]  Continue plan of care  []  Update interventions per flow sheet       []  Discharge due to:_  [x]  Progress ROM, strength, and gait as tolerated.     Samra Segura V, PT  12/9/2021

## 2021-12-13 ENCOUNTER — HOSPITAL ENCOUNTER (OUTPATIENT)
Dept: PHYSICAL THERAPY | Age: 46
Discharge: HOME | End: 2021-12-13
Payer: MEDICAID

## 2021-12-13 PROCEDURE — 97110 THERAPEUTIC EXERCISES: CPT | Performed by: PHYSICAL THERAPIST

## 2021-12-13 PROCEDURE — 97140 MANUAL THERAPY 1/> REGIONS: CPT | Performed by: PHYSICAL THERAPIST

## 2021-12-13 NOTE — PROGRESS NOTES
PT DAILY TREATMENT NOTE - [facility] 2-15    Patient Name: Glenwood Hashimoto  Date:2021  : 1975  [x]  Patient  Verified  Payor: BLUE [employer]OSS MEDICAID / Plan: [facility] [employer]KEEPERS PLUS / Product Type: Managed Care Medicaid /    In time:  1:15 pm  Out time: 2:40 pm  Total Treatment Time (min): 85  Total Timed Codes (min): 75  1:1 Treatment Time ( [address] only): 75  Visit #:  25    Treatment Area: Left knee pain [M25.562]    SUBJECTIVE  Pain Level (0-10 scale): 3  Any medication changes, allergies to medications, adverse drug reactions, diagnosis change, or new procedure performed?: [x] No    [] Yes (see summary sheet for update)  Subjective functional status/changes:     States knee is about the same. Frustrated by lack of progress. 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 OBJECTIVE  Effusion:   Mid patella =   Supra patella -1/2 cm   Infra patella +1/2 cm    PROM:  125 degrees seated flexion (after repeated stretching)    Gait:  Improved knee flexion in swing phase. Modality rationale: decrease edema, decrease inflammation and decrease pain to improve the patients ability to bend, walk, run.    Min Type Additional Details       [x] Estim: []Att   []Unatt    []TENS instruct                  []IFC  []Premod   []NMES                     [x]Greenlandic 10/20 sec on/off  []w/US   []w/ice   []w/heat  Position:  Long sitting  Location:  Left quad       []  Traction: [] Cervical       []Lumbar                       [] Prone          []Supine                       []Intermittent   []Continuous Lbs:  [] before manual  [] after manual  []w/heat    []  Ultrasound: []Continuous   [] Pulsed                       at: []1MHz   []3MHz Location:  W/cm2:    [] Paraffin         Location:   []w/heat    []  Ice     []  Heat  []  Ice massage Position:  Location:    []  Laser  []  Other: Position:  Location:      [x]  Vasopneumatic Device Pressure:       [] lo [] med [x] hi   Temperature: 34 degrees     [x] Skin assessment post-treatment:  [x]intact []redness- no adverse reaction    []redness  adverse reaction:     45 min Therapeutic Exercise:  [x] See flow sheet :   Rationale: increase ROM and increase strength to improve the patients ability to bend, walk, run. 30 min Manual Therapy:   Patella mobs  Kathleen scar and graft site mobs  Manual extension stretch (omit)  Seated flexion PROM  STM left distal quad while in stretch position (omit)  Supine rectus femoris stretch  Gentle posterior glides in sitting  Prone quad stretch with pre-stretch on wedge (painful)   Rationale: decrease pain, increase ROM and increase tissue extensibility to improve the patients ability to bend, walk, run.     min Gait Training:  ___ feet with ___ device on level surfaces with ___ level of assist   Rationale: increase ROM, increase strength, improve coordination and increase proprioception  to improve the patients ability to walk with normal gait pattern. With   [x] TE   [] TA   [] Neuro   [] SC   [] other: Patient Education: [x] Review HEP    [] Progressed/Changed HEP based on:   [] positioning   [] body mechanics   [] transfers   [] heat/ice application    [x] Progress to date and plan of care. Other Objective/Functional Measures:      Pain Level (0-10 scale) post treatment: 2    ASSESSMENT/Changes in Function:  Increased PROM and exercise tolerance today. Patient will continue to benefit from skilled PT services to modify and progress therapeutic interventions, address functional mobility deficits, address ROM deficits, address strength deficits, analyze and address soft tissue restrictions, analyze and cue movement patterns, analyze and modify body mechanics/ergonomics and assess and modify postural abnormalities to attain remaining goals.      []  See Plan of Care  []  See progress note/recertification  []  See Discharge Summary         Progress towards goals / Updated goals:    PLAN  [x]  Upgrade activities as tolerated [x]  Continue plan of care  []  Update interventions per flow sheet       []  Discharge due to:_  [x]  Progress ROM, strength, and gait. Progress dynamic activities.     Junior Rodas V, PT  12/13/2021

## 2021-12-16 ENCOUNTER — HOSPITAL ENCOUNTER (OUTPATIENT)
Dept: PHYSICAL THERAPY | Age: 46
Discharge: HOME | End: 2021-12-16
Payer: MEDICAID

## 2021-12-16 PROCEDURE — 97110 THERAPEUTIC EXERCISES: CPT | Performed by: PHYSICAL THERAPIST

## 2021-12-16 PROCEDURE — 97140 MANUAL THERAPY 1/> REGIONS: CPT | Performed by: PHYSICAL THERAPIST

## 2021-12-16 NOTE — PROGRESS NOTES
PT DAILY TREATMENT NOTE - [facility] 2-15    Patient Name: Liisa Peabody  Date:2021  : 1975  [x]  Patient  Verified  Payor: BLUE CROSS MEDICAID / Plan: [address] [place] [address] / Product Type: Managed Care Medicaid /    In time:  12:15 pm  Out time: 1:40 pm  Total Treatment Time (min): 85  Total Timed Codes (min): 75  1:1 Treatment Time ( [address] only): 75  Visit #:  26    Treatment Area: Left knee pain [M25.562]    SUBJECTIVE  Pain Level (0-10 scale): 2  Any medication changes, allergies to medications, adverse drug reactions, diagnosis change, or new procedure performed?: [x] No    [] Yes (see summary sheet for update)  Subjective functional status/changes:     States feels better than previous visit. \"It's popping more. \"    OBJECTIVE  Effusion:   Mid patella =   Supra patella -1/2 cm   Infra patella +1/2 cm    PROM:  127 degrees seated flexion (after repeated stretching)    Gait:  Improved knee flexion in swing phase. Modality rationale: decrease edema, decrease inflammation and decrease pain to improve the patients ability to bend, walk, run.    Min Type Additional Details       [x] Estim: []Att   []Unatt    []TENS instruct                  []IFC  []Premod   []NMES                     [x]Surinamese 10/20 sec on/off  []w/US   []w/ice   []w/heat  Position:  Long sitting  Location:  Left quad       []  Traction: [] Cervical       []Lumbar                       [] Prone          []Supine                       []Intermittent   []Continuous Lbs:  [] before manual  [] after manual  []w/heat    []  Ultrasound: []Continuous   [] Pulsed                       at: []1MHz   []3MHz Location:  W/cm2:    [] Paraffin         Location:   []w/heat    []  Ice     []  Heat  []  Ice massage Position:  Location:    []  Laser  []  Other: Position:  Location:      [x]  Vasopneumatic Device Pressure:       [] lo [] med [x] hi   Temperature: 34 degrees     [x] Skin assessment post-treatment:  [x]intact []redness- no adverse reaction    []redness  adverse reaction:     45 min Therapeutic Exercise:  [x] See flow sheet :   Rationale: increase ROM and increase strength to improve the patients ability to bend, walk, run. 30 min Manual Therapy:   Patella mobs  Kathleen scar and graft site mobs  Manual extension stretch  Seated flexion PROM  STM left distal quad while in stretch position (omit)  Supine rectus femoris stretch  Gentle posterior glides in sitting   Rationale: decrease pain, increase ROM and increase tissue extensibility to improve the patients ability to bend, walk, run.     min Gait Training:  ___ feet with ___ device on level surfaces with ___ level of assist   Rationale: increase ROM, increase strength, improve coordination and increase proprioception  to improve the patients ability to walk with normal gait pattern. With   [x] TE   [] TA   [] Neuro   [] SC   [] other: Patient Education: [x] Review HEP    [] Progressed/Changed HEP based on:   [] positioning   [] body mechanics   [] transfers   [] heat/ice application    [x] Progress to date and plan of care. Other Objective/Functional Measures:      Pain Level (0-10 scale) post treatment: 1    ASSESSMENT/Changes in Function:  Improved gait pattern. Tolerated exercises without increased pain. Patient will continue to benefit from skilled PT services to modify and progress therapeutic interventions, address functional mobility deficits, address ROM deficits, address strength deficits, analyze and address soft tissue restrictions, analyze and cue movement patterns, analyze and modify body mechanics/ergonomics and assess and modify postural abnormalities to attain remaining goals.      []  See Plan of Care  []  See progress note/recertification  []  See Discharge Summary         Progress towards goals / Updated goals:    PLAN  [x]  Upgrade activities as tolerated     [x]  Continue plan of care  []  Update interventions per flow sheet       []  Discharge due to:_  [x]  Progress ROM, strength, and gait. Progress dynamic activities.     Samra Segura V, PT  12/16/2021

## 2021-12-20 ENCOUNTER — HOSPITAL ENCOUNTER (OUTPATIENT)
Dept: PHYSICAL THERAPY | Age: 46
Discharge: HOME | End: 2021-12-20
Payer: MEDICAID

## 2021-12-20 PROCEDURE — 97110 THERAPEUTIC EXERCISES: CPT | Performed by: PHYSICAL THERAPIST

## 2021-12-20 PROCEDURE — 97140 MANUAL THERAPY 1/> REGIONS: CPT | Performed by: PHYSICAL THERAPIST

## 2021-12-20 NOTE — PROGRESS NOTES
PT DAILY TREATMENT NOTE - [facility] 2-15    Patient Name: Vadim Veliz  Date:2021  : 1975  [x]  Patient  Verified  Payor: BLUE CROSS MEDICAID / Plan: [facility] [place] [facility] / Product Type: Managed Care Medicaid /    In time:  1:15 pm  Out time: 2;20 pm  Total Treatment Time (min): 80  Total Timed Codes (min): 75  1:1 Treatment Time ( [address] only): 75  Visit #:  27    Treatment Area: Left knee pain [M25.562]    SUBJECTIVE  Pain Level (0-10 scale): 2  Any medication changes, allergies to medications, adverse drug reactions, diagnosis change, or new procedure performed?: [x] No    [] Yes (see summary sheet for update)  Subjective functional status/changes: \"My knee hasn't been swelling after working like it did. \"    OBJECTIVE  Effusion:   Mid patella =   Supra patella -1/2 cm   Infra patella +1/2 cm    PROM:  120 degrees seated flexion    Gait:  Improved knee flexion in swing phase. Modality rationale: decrease edema, decrease inflammation and decrease pain to improve the patients ability to bend, walk, run.    Min Type Additional Details       [x] Estim: []Att   []Unatt    []TENS instruct                  []IFC  []Premod   []NMES                     [x]Bruneian 10/20 sec on/off  []w/US   []w/ice   []w/heat  Position:  Long sitting  Location:  Left quad       []  Traction: [] Cervical       []Lumbar                       [] Prone          []Supine                       []Intermittent   []Continuous Lbs:  [] before manual  [] after manual  []w/heat    []  Ultrasound: []Continuous   [] Pulsed                       at: []1MHz   []3MHz Location:  W/cm2:    [] Paraffin         Location:   []w/heat    []  Ice     []  Heat  []  Ice massage Position:  Location:    []  Laser  []  Other: Position:  Location:      [x]  Vasopneumatic Device Pressure:       [] lo [] med [x] hi   Temperature: 34 degrees     [x] Skin assessment post-treatment:  [x]intact []redness- no adverse reaction    []redness  adverse reaction:     45 min Therapeutic Exercise:  [x] See flow sheet :   Rationale: increase ROM and increase strength to improve the patients ability to bend, walk, run. 25 min Manual Therapy:   Patella mobs  Kathleen scar and graft site mobs  Manual extension stretch  Seated flexion PROM  STM left distal quad while in stretch position (omit)  Supine rectus femoris stretch  Gentle posterior glides in sitting   Rationale: decrease pain, increase ROM and increase tissue extensibility to improve the patients ability to bend, walk, run.     min Gait Training:  ___ feet with ___ device on level surfaces with ___ level of assist   Rationale: increase ROM, increase strength, improve coordination and increase proprioception  to improve the patients ability to walk with normal gait pattern. With   [x] TE   [] TA   [] Neuro   [] SC   [] other: Patient Education: [x] Review HEP    [] Progressed/Changed HEP based on:   [] positioning   [] body mechanics   [] transfers   [] heat/ice application    [x] Progress to date and plan of care. Other Objective/Functional Measures:      Pain Level (0-10 scale) post treatment: 1    ASSESSMENT/Changes in Function:  Tolerated new exercises without complaint. Good quad work noted. Patient will continue to benefit from skilled PT services to modify and progress therapeutic interventions, address functional mobility deficits, address ROM deficits, address strength deficits, analyze and address soft tissue restrictions, analyze and cue movement patterns, analyze and modify body mechanics/ergonomics and assess and modify postural abnormalities to attain remaining goals.      []  See Plan of Care  []  See progress note/recertification  []  See Discharge Summary         Progress towards goals / Updated goals:    PLAN  [x]  Upgrade activities as tolerated     [x]  Continue plan of care  []  Update interventions per flow sheet       []  Discharge due to:_  [x]  Progress ROM, strength, and gait.  Progress dynamic activities.     Meredith Cabrales V, PT  12/20/2021

## 2021-12-23 ENCOUNTER — HOSPITAL ENCOUNTER (OUTPATIENT)
Dept: PHYSICAL THERAPY | Age: 46
Discharge: HOME | End: 2021-12-23
Payer: MEDICAID

## 2021-12-23 PROCEDURE — 97140 MANUAL THERAPY 1/> REGIONS: CPT | Performed by: PHYSICAL THERAPIST

## 2021-12-23 PROCEDURE — 97110 THERAPEUTIC EXERCISES: CPT | Performed by: PHYSICAL THERAPIST

## 2021-12-23 NOTE — PROGRESS NOTES
PT DAILY TREATMENT NOTE - [facility] 2-15    Patient Name: Miguelangel Anaya  Date:2021  : 1975  [x]  Patient  Verified  Payor: BLUE CROSS MEDICAID / Plan: [facility] / Product Type: Managed Care Medicaid /    In time:  12:15 pm  Out time: 1:30 pm  Total Treatment Time (min): 75  Total Timed Codes (min): 70  1:1 Treatment Time ( [address] only): 65  Visit #:  28    Treatment Area: Left knee pain [M25.562]    SUBJECTIVE  Pain Level (0-10 scale): 3  Any medication changes, allergies to medications, adverse drug reactions, diagnosis change, or new procedure performed?: [x] No    [] Yes (see summary sheet for update)  Subjective functional status/changes: \"It hurts today. It feels stiff. \"    OBJECTIVE  Decreased inferior and superior glide patella mobility   Local swelling and stiffness at graft site    Palpation:  Marked tenderness over patella tendon    AROM:  0 to 107 degrees seated flexion    PROM:  120 degrees seated flexion    Gait:  Decreased knee flexion in swing phase today. Modality rationale: decrease edema, decrease inflammation and decrease pain to improve the patients ability to bend, walk, run.    Min Type Additional Details       [x] Estim: []Att   []Unatt    []TENS instruct                  []IFC  []Premod   []NMES                     [x]Bulgarian 10/20 sec on/off  []w/US   []w/ice   []w/heat  Position:  Long sitting  Location:  Left quad       []  Traction: [] Cervical       []Lumbar                       [] Prone          []Supine                       []Intermittent   []Continuous Lbs:  [] before manual  [] after manual  []w/heat    []  Ultrasound: []Continuous   [] Pulsed                       at: []1MHz   []3MHz Location:  W/cm2:    [] Paraffin         Location:   []w/heat    []  Ice     []  Heat  []  Ice massage Position:  Location:    []  Laser  []  Other: Position:  Location:      [x]  Vasopneumatic Device Pressure:       [] lo [] med [x] hi   Temperature: 34 degrees [x] Skin assessment post-treatment:  [x]intact []redness- no adverse reaction    []redness  adverse reaction:     45 min Therapeutic Exercise:  [x] See flow sheet :  Additions as charted. Rationale: increase ROM and increase strength to improve the patients ability to bend, walk, run. 25 min Manual Therapy:   Patella mobs  Kathleen scar and graft site mobs  Manual extension stretch  Seated flexion PROM  STM left distal quad while in stretch position (omit)  Supine rectus femoris stretch  Gentle posterior glides in sitting   Rationale: decrease pain, increase ROM and increase tissue extensibility to improve the patients ability to bend, walk, run.     min Gait Training:  ___ feet with ___ device on level surfaces with ___ level of assist   Rationale: increase ROM, increase strength, improve coordination and increase proprioception  to improve the patients ability to walk with normal gait pattern. With   [x] TE   [] TA   [] Neuro   [] SC   [] other: Patient Education: [x] Review HEP    [] Progressed/Changed HEP based on:   [] positioning   [] body mechanics   [] transfers   [] heat/ice application    [x] Progress to date and plan of care. Other Objective/Functional Measures:      Pain Level (0-10 scale) post treatment: 2    ASSESSMENT/Changes in Function:  Pain and stiffness limit knee flexion PROM. Left hip hike noted on elliptical due to knee stiffness. Patient will continue to benefit from skilled PT services to modify and progress therapeutic interventions, address functional mobility deficits, address ROM deficits, address strength deficits, analyze and address soft tissue restrictions, analyze and cue movement patterns, analyze and modify body mechanics/ergonomics and assess and modify postural abnormalities to attain remaining goals.      []  See Plan of Care  []  See progress note/recertification  []  See Discharge Summary         Progress towards goals / Updated goals:    PLAN  [x] Upgrade activities as tolerated     [x]  Continue 2x/week for 6 weeks  []  Update interventions per flow sheet       []  Discharge due to:_  [x]  Progress ROM, strength, and gait. Progress dynamic activities.     Alesha Aceves V, PT  12/23/2021

## 2021-12-27 ENCOUNTER — APPOINTMENT (OUTPATIENT)
Dept: PHYSICAL THERAPY | Age: 46
End: 2021-12-27
Payer: MEDICAID

## 2021-12-27 ENCOUNTER — HOSPITAL ENCOUNTER (OUTPATIENT)
Dept: PHYSICAL THERAPY | Age: 46
Discharge: HOME | End: 2021-12-27
Payer: MEDICAID

## 2021-12-27 PROCEDURE — 97016 VASOPNEUMATIC DEVICE THERAPY: CPT | Performed by: PHYSICAL THERAPIST

## 2021-12-27 PROCEDURE — 97110 THERAPEUTIC EXERCISES: CPT | Performed by: PHYSICAL THERAPIST

## 2021-12-27 PROCEDURE — 97140 MANUAL THERAPY 1/> REGIONS: CPT | Performed by: PHYSICAL THERAPIST

## 2021-12-27 NOTE — PROGRESS NOTES
PT DAILY TREATMENT NOTE - [facility] 2-15    Patient Name: Augustina Alvarado  Date:2021  : 1975  [x]  Patient  Verified  Payor: BLUE CROSS MEDICAID / [facility] [place] / Product Type: Managed Care Medicaid /    In time:  12:30 pm  Out time: 2:00 pm  Total Treatment Time (min): 90  Total Timed Codes (min): 70  1:1 Treatment Time ( [address] only): 65  Visit #:  29    Treatment Area: Left knee pain [M25.562]    SUBJECTIVE  Pain Level (0-10 scale): 2  Any medication changes, allergies to medications, adverse drug reactions, diagnosis change, or new procedure performed?: [x] No    [] Yes (see summary sheet for update)  Subjective functional status/changes:     \"I worked in a different place this morning at work. It was a lot more heavy packages. It was sore and swollen. \"    OBJECTIVE    PROM:  123 degrees seated flexion    Modality rationale: decrease edema, decrease inflammation and decrease pain to improve the patients ability to bend, walk, run.    Min Type Additional Details       [x] Estim: []Att   []Unatt    []TENS instruct                  []IFC  []Premod   []NMES                     [x]Congolese 10/20 sec on/off  []w/US   []w/ice   []w/heat  Position:  Long sitting  Location:  Left quad       []  Traction: [] Cervical       []Lumbar                       [] Prone          []Supine                       []Intermittent   []Continuous Lbs:  [] before manual  [] after manual  []w/heat    []  Ultrasound: []Continuous   [] Pulsed                       at: []1MHz   []3MHz Location:  W/cm2:    [] Paraffin         Location:   []w/heat    []  Ice     []  Heat  []  Ice massage Position:  Location:    []  Laser  []  Other: Position:  Location:   15   [x]  Vasopneumatic Device Pressure:       [] lo [] med [x] hi   Temperature: 34 degrees     [x] Skin assessment post-treatment:  [x]intact []redness- no adverse reaction    []redness  adverse reaction:     45 min Therapeutic Exercise:  [x] See flow sheet : Additions as charted. Rationale: increase ROM and increase strength to improve the patients ability to bend, walk, run. 25 min Manual Therapy:   Patella mobs  Kathleen scar and graft site mobs  Manual extension stretch  Seated flexion PROM  IASTM left distal quad   Supine rectus femoris stretch  Posterior glides in sitting   Rationale: decrease pain, increase ROM and increase tissue extensibility to improve the patients ability to bend, walk, run.     min Gait Training:  ___ feet with ___ device on level surfaces with ___ level of assist   Rationale: increase ROM, increase strength, improve coordination and increase proprioception  to improve the patients ability to walk with normal gait pattern. With   [x] TE   [] TA   [] Neuro   [] SC   [] other: Patient Education: [x] Review HEP    [] Progressed/Changed HEP based on:   [] positioning   [] body mechanics   [] transfers   [] heat/ice application    [x] Progress to date and plan of care. Other Objective/Functional Measures:      Pain Level (0-10 scale) post treatment: 3    ASSESSMENT/Changes in Function:  Increased soreness following new exercises today. Patient will continue to benefit from skilled PT services to modify and progress therapeutic interventions, address functional mobility deficits, address ROM deficits, address strength deficits, analyze and address soft tissue restrictions, analyze and cue movement patterns, analyze and modify body mechanics/ergonomics and assess and modify postural abnormalities to attain remaining goals. []  See Plan of Care  []  See progress note/recertification  []  See Discharge Summary         Progress towards goals / Updated goals:    PLAN  [x]  Upgrade activities as tolerated     [x]  Continue 2x/week for 6 weeks  []  Update interventions per flow sheet       []  Discharge due to:_  [x]  Progress ROM, strength, and gait. Progress dynamic activities.     Rachid Pena V, PT  12/27/2021

## 2021-12-29 ENCOUNTER — HOSPITAL ENCOUNTER (OUTPATIENT)
Dept: PHYSICAL THERAPY | Age: 46
Discharge: HOME | End: 2021-12-29
Payer: MEDICAID

## 2021-12-29 PROCEDURE — 97035 APP MDLTY 1+ULTRASOUND EA 15: CPT | Performed by: PHYSICAL THERAPIST

## 2021-12-29 PROCEDURE — 97140 MANUAL THERAPY 1/> REGIONS: CPT | Performed by: PHYSICAL THERAPIST

## 2021-12-29 PROCEDURE — 97110 THERAPEUTIC EXERCISES: CPT | Performed by: PHYSICAL THERAPIST

## 2021-12-29 NOTE — PROGRESS NOTES
PT DAILY TREATMENT NOTE - [facility] 2-15    Patient Name: Ander Conroy  Date:2021  : 1975  [x]  Patient  Verified  Payor: BLUE CROSS MEDICAID / [facility]: [facility] Gigi Carbone / Product Type: Managed Care Medicaid /    In time:  12:00 pm  Out time: 1:45 pm  Total Treatment Time (min): 105  Total Timed Codes (min): 88  1:1 Treatment Time ([facility] only): 88  Visit #:  30    Treatment Area: Left knee pain [M25.562]    SUBJECTIVE  Pain Level (0-10 scale): No rating given  Any medication changes, allergies to medications, adverse drug reactions, diagnosis change, or new procedure performed?: [x] No    [] Yes (see summary sheet for update)  Subjective functional status/changes:     Patient continues to complain of knee stiffness. Frustrated with lack of ROM progress. Has been in contact with Dr. Jarocho Herrera. Follow up appointment with him next week. OBJECTIVE   Knee Girth:   Mid patella =   Suprapatella -1 cm   Infrapatella =    AROM:  0 to 118 degrees (seated flexion)  PROM:  130 degrees seated flexion    Palpation:  Palpable knot/possible scar tissue in lateral quad tendon. Modality rationale: decrease edema, decrease inflammation and decrease pain to improve the patients ability to bend, walk, run.    Min Type Additional Details       [x] Estim: []Att   []Unatt    []TENS instruct                  []IFC  []Premod   []NMES                     [x]Mozambican 10/20 sec on/off  []w/US   []w/ice   []w/heat  Position:  Long sitting  Location:  Left quad       []  Traction: [] Cervical       []Lumbar                       [] Prone          []Supine                       []Intermittent   []Continuous Lbs:  [] before manual  [] after manual  []w/heat   8 [x]  Ultrasound: [x]Continuous   [] Pulsed                       at: [x]1MHz   []3MHz Location:  Left distal quad  W/cm2:  1.1     [] Paraffin         Location:   []w/heat    []  Ice     []  Heat  []  Ice massage Position:  Location:    []  Laser  []  Other: Position:  Location:      [x]  Vasopneumatic Device Pressure:       [] lo [] med [x] hi   Temperature: 34 degrees     [x] Skin assessment post-treatment:  [x]intact []redness- no adverse reaction    []redness  adverse reaction:     45 min Therapeutic Exercise:  [x] See flow sheet :  Additions as charted. Rationale: increase ROM and increase strength to improve the patients ability to bend, walk, run. 35 min Manual Therapy:   Patella mobs  Kathleen scar and graft site mobs  IASTM left distal quad   XFM left lateral quad tendon  Supine rectus femoris stretch  Posterior glides in sitting  Seated flexion PROM (repeated stretching during visit)   Rationale: decrease pain, increase ROM and increase tissue extensibility to improve the patients ability to bend, walk, run.     min Gait Training:  ___ feet with ___ device on level surfaces with ___ level of assist   Rationale: increase ROM, increase strength, improve coordination and increase proprioception  to improve the patients ability to walk with normal gait pattern. With   [x] TE   [] TA   [] Neuro   [] SC   [] other: Patient Education: [x] Review HEP    [] Progressed/Changed HEP based on:   [] positioning   [] body mechanics   [] transfers   [] heat/ice application    [x] Discussed overall functional improvements with gait pattern, activity level, standing tolerance, and ability to RTW. Recommended follow up with covering therapist while primary PT out on PTO. Other Objective/Functional Measures:      Pain Level (0-10 scale) post treatment: No rating given    ASSESSMENT/Changes in Function:  Patient frustrated with lack of AROM progress. Has posterior knee pain with end range flexion. Slight improvements in active and passive ROM, but limited by pain and stiffness.   Patient will continue to benefit from skilled PT services to modify and progress therapeutic interventions, address functional mobility deficits, address ROM deficits, address strength deficits, analyze and address soft tissue restrictions, analyze and cue movement patterns, analyze and modify body mechanics/ergonomics and assess and modify postural abnormalities to attain remaining goals. []  See Plan of Care  []  See progress note/recertification  []  See Discharge Summary         Progress towards goals / Updated goals:    PLAN  [x]  Upgrade activities as tolerated     [x]  Continue to push ROM, strength, and functional activities. []  Update interventions per flow sheet       [x]  Follow up with Dr. Sheila Perla next week. Requested me not to send a progress note at this time. Patient deciding whether she will follow up here with recommended covering therapist, performing HEP only on her own, or mentioned the possibility of changing PT facilities. Patient instructed to contact the  to schedule if she decides to be treated next week.     Lorenzo Gotti V, PT  12/29/2021

## 2021-12-30 ENCOUNTER — APPOINTMENT (OUTPATIENT)
Dept: PHYSICAL THERAPY | Age: 46
End: 2021-12-30
Payer: MEDICAID

## 2022-01-24 NOTE — PROGRESS NOTES
Physical Therapy at [facility],   a part of [facility]  [address]  [place], [address]  Phone: [phone]  Fax: [phone]    Medicaid Discharge Summary     Patient name: Camden Ring  : 1975  Provider#:[id number]  Referral source: Denver Perez MD      Medical/Treatment Diagnosis: Left knee pain [M25.562]     Prior Hospitalization: see medical history     Comorbidities: None  Prior Level of Function:  Able to bend, walk, run, cut, work as a  without limitation. Medications: Verified on Patient Summary List    Start of Care:  2021      Onset Date:  2021   Visits from Start of Care: 30    Missed Visits: 0  Reporting Period : 2021 to 2021    Long Term Goals:   1. AROM +2 to 135 degrees to tolerate bending and squatting. Not met   2. Strength left quads and hip 4+/5, hamstrings 5/5 to tolerate prolonged walking and stairs. Not assessed   3. Walks with normal gait pattern. Not met  4. Able to run, change directions, perform intentional cutting without difficulty. Not met    ASSESSMENT/SUMMARY OF CARE: Patient continued to complain of knee pain and stiffness throughout her course of treatment, which limited her knee flexion ROM. She stated at her last MD follow up, Dr. Jacquelyn Escobar recommended she go see a \"more aggressive\" therapist.    RECOMMENDATIONS:  [x]Discontinue therapy: [x]MD discharge      []Patient is non-compliant or has abdicated      []Due to lack of appreciable progress towards set goals    Rolando Guadalupe V, PT 2022      ______________________________________________________________________    NOTE TO PHYSICIAN:  Please complete the following and fax to: [facility]: [phone]  Retain this original for your records. If you are unable to process this request in 24 hours, please contact our office.      [de-identified] Signature:____________________  Date:____________Time:_________ Chyna Keith MD